# Patient Record
Sex: MALE | Race: WHITE | NOT HISPANIC OR LATINO | Employment: FULL TIME | URBAN - METROPOLITAN AREA
[De-identification: names, ages, dates, MRNs, and addresses within clinical notes are randomized per-mention and may not be internally consistent; named-entity substitution may affect disease eponyms.]

---

## 2017-02-18 ENCOUNTER — OFFICE VISIT (OUTPATIENT)
Dept: URGENT CARE | Facility: MEDICAL CENTER | Age: 47
End: 2017-02-18
Payer: COMMERCIAL

## 2017-02-18 PROCEDURE — 99213 OFFICE O/P EST LOW 20 MIN: CPT

## 2017-03-20 ENCOUNTER — OFFICE VISIT (OUTPATIENT)
Dept: URGENT CARE | Facility: MEDICAL CENTER | Age: 47
End: 2017-03-20
Payer: COMMERCIAL

## 2017-03-20 PROCEDURE — 99213 OFFICE O/P EST LOW 20 MIN: CPT

## 2017-04-21 ENCOUNTER — ALLSCRIPTS OFFICE VISIT (OUTPATIENT)
Dept: OTHER | Facility: OTHER | Age: 47
End: 2017-04-21

## 2017-04-21 DIAGNOSIS — Z87.19 PERSONAL HISTORY OF OTHER DISEASES OF THE DIGESTIVE SYSTEM: ICD-10-CM

## 2017-04-21 DIAGNOSIS — M54.9 DORSALGIA: ICD-10-CM

## 2017-04-21 DIAGNOSIS — E87.1 HYPO-OSMOLALITY AND HYPONATREMIA: ICD-10-CM

## 2017-04-21 DIAGNOSIS — E78.00 PURE HYPERCHOLESTEROLEMIA: ICD-10-CM

## 2017-05-29 ENCOUNTER — HOSPITAL ENCOUNTER (OUTPATIENT)
Dept: RADIOLOGY | Facility: MEDICAL CENTER | Age: 47
Discharge: HOME | End: 2017-05-29
Attending: PHYSICIAN ASSISTANT | Admitting: FAMILY MEDICINE
Payer: COMMERCIAL

## 2017-05-29 ENCOUNTER — OFFICE VISIT (OUTPATIENT)
Dept: URGENT CARE | Facility: MEDICAL CENTER | Age: 47
End: 2017-05-29
Payer: COMMERCIAL

## 2017-05-29 DIAGNOSIS — M65.9 SYNOVITIS AND TENOSYNOVITIS: ICD-10-CM

## 2017-05-29 DIAGNOSIS — M79.643 PAIN OF HAND: ICD-10-CM

## 2017-05-29 DIAGNOSIS — M79.641 PAIN OF RIGHT HAND: ICD-10-CM

## 2017-05-29 PROCEDURE — 73130 X-RAY EXAM OF HAND: CPT

## 2017-05-29 PROCEDURE — 99213 OFFICE O/P EST LOW 20 MIN: CPT

## 2017-06-14 ENCOUNTER — APPOINTMENT (OUTPATIENT)
Dept: RADIOLOGY | Facility: CLINIC | Age: 47
End: 2017-06-14
Payer: COMMERCIAL

## 2017-06-14 ENCOUNTER — ALLSCRIPTS OFFICE VISIT (OUTPATIENT)
Dept: OTHER | Facility: OTHER | Age: 47
End: 2017-06-14

## 2017-06-14 DIAGNOSIS — M79.643 PAIN OF HAND: ICD-10-CM

## 2017-06-14 PROCEDURE — 73130 X-RAY EXAM OF HAND: CPT

## 2017-09-11 ENCOUNTER — ALLSCRIPTS OFFICE VISIT (OUTPATIENT)
Dept: OTHER | Facility: OTHER | Age: 47
End: 2017-09-11

## 2017-09-11 DIAGNOSIS — E78.00 PURE HYPERCHOLESTEROLEMIA: ICD-10-CM

## 2017-09-11 DIAGNOSIS — S81.802A OPEN WOUND OF LEFT LOWER LEG: ICD-10-CM

## 2017-09-11 DIAGNOSIS — R74.8 ABNORMAL LEVELS OF OTHER SERUM ENZYMES: ICD-10-CM

## 2017-09-18 ENCOUNTER — GENERIC CONVERSION - ENCOUNTER (OUTPATIENT)
Dept: OTHER | Facility: OTHER | Age: 47
End: 2017-09-18

## 2017-09-19 ENCOUNTER — APPOINTMENT (OUTPATIENT)
Dept: LAB | Facility: MEDICAL CENTER | Age: 47
End: 2017-09-19
Payer: COMMERCIAL

## 2017-09-19 DIAGNOSIS — E78.00 PURE HYPERCHOLESTEROLEMIA: ICD-10-CM

## 2017-09-19 DIAGNOSIS — S81.802A OPEN WOUND OF LEFT LOWER LEG: ICD-10-CM

## 2017-09-19 DIAGNOSIS — R74.8 ABNORMAL LEVELS OF OTHER SERUM ENZYMES: ICD-10-CM

## 2017-09-19 LAB
ALBUMIN SERPL BCP-MCNC: 3.8 G/DL (ref 3.5–5)
ALP SERPL-CCNC: 75 U/L (ref 46–116)
ALT SERPL W P-5'-P-CCNC: 46 U/L (ref 12–78)
ANION GAP SERPL CALCULATED.3IONS-SCNC: 4 MMOL/L (ref 4–13)
AST SERPL W P-5'-P-CCNC: 37 U/L (ref 5–45)
BASOPHILS # BLD AUTO: 0.05 THOUSANDS/ΜL (ref 0–0.1)
BASOPHILS NFR BLD AUTO: 1 % (ref 0–1)
BILIRUB SERPL-MCNC: 0.52 MG/DL (ref 0.2–1)
BUN SERPL-MCNC: 12 MG/DL (ref 5–25)
CALCIUM SERPL-MCNC: 9.1 MG/DL (ref 8.3–10.1)
CHLORIDE SERPL-SCNC: 104 MMOL/L (ref 100–108)
CHOLEST SERPL-MCNC: 196 MG/DL (ref 50–200)
CO2 SERPL-SCNC: 29 MMOL/L (ref 21–32)
CREAT SERPL-MCNC: 0.93 MG/DL (ref 0.6–1.3)
EOSINOPHIL # BLD AUTO: 0.31 THOUSAND/ΜL (ref 0–0.61)
EOSINOPHIL NFR BLD AUTO: 6 % (ref 0–6)
ERYTHROCYTE [DISTWIDTH] IN BLOOD BY AUTOMATED COUNT: 13.2 % (ref 11.6–15.1)
EST. AVERAGE GLUCOSE BLD GHB EST-MCNC: 128 MG/DL
GFR SERPL CREATININE-BSD FRML MDRD: 97 ML/MIN/1.73SQ M
GLUCOSE P FAST SERPL-MCNC: 112 MG/DL (ref 65–99)
HBA1C MFR BLD: 6.1 % (ref 4.2–6.3)
HCT VFR BLD AUTO: 43 % (ref 36.5–49.3)
HDLC SERPL-MCNC: 29 MG/DL (ref 40–60)
HGB BLD-MCNC: 14.4 G/DL (ref 12–17)
LYMPHOCYTES # BLD AUTO: 2.22 THOUSANDS/ΜL (ref 0.6–4.47)
LYMPHOCYTES NFR BLD AUTO: 41 % (ref 14–44)
MCH RBC QN AUTO: 30 PG (ref 26.8–34.3)
MCHC RBC AUTO-ENTMCNC: 33.5 G/DL (ref 31.4–37.4)
MCV RBC AUTO: 90 FL (ref 82–98)
MONOCYTES # BLD AUTO: 0.35 THOUSAND/ΜL (ref 0.17–1.22)
MONOCYTES NFR BLD AUTO: 6 % (ref 4–12)
NEUTROPHILS # BLD AUTO: 2.5 THOUSANDS/ΜL (ref 1.85–7.62)
NEUTS SEG NFR BLD AUTO: 46 % (ref 43–75)
NRBC BLD AUTO-RTO: 0 /100 WBCS
PLATELET # BLD AUTO: 183 THOUSANDS/UL (ref 149–390)
PMV BLD AUTO: 10.8 FL (ref 8.9–12.7)
POTASSIUM SERPL-SCNC: 4.7 MMOL/L (ref 3.5–5.3)
PROT SERPL-MCNC: 7.4 G/DL (ref 6.4–8.2)
RBC # BLD AUTO: 4.8 MILLION/UL (ref 3.88–5.62)
SODIUM SERPL-SCNC: 137 MMOL/L (ref 136–145)
TRIGL SERPL-MCNC: 489 MG/DL
WBC # BLD AUTO: 5.44 THOUSAND/UL (ref 4.31–10.16)

## 2017-09-19 PROCEDURE — 80061 LIPID PANEL: CPT

## 2017-09-19 PROCEDURE — 85025 COMPLETE CBC W/AUTO DIFF WBC: CPT

## 2017-09-19 PROCEDURE — 80053 COMPREHEN METABOLIC PANEL: CPT

## 2017-09-19 PROCEDURE — 83036 HEMOGLOBIN GLYCOSYLATED A1C: CPT

## 2017-09-19 PROCEDURE — 36415 COLL VENOUS BLD VENIPUNCTURE: CPT

## 2017-09-25 ENCOUNTER — GENERIC CONVERSION - ENCOUNTER (OUTPATIENT)
Dept: OTHER | Facility: OTHER | Age: 47
End: 2017-09-25

## 2017-10-02 ENCOUNTER — ALLSCRIPTS OFFICE VISIT (OUTPATIENT)
Dept: OTHER | Facility: OTHER | Age: 47
End: 2017-10-02

## 2017-10-11 NOTE — PROGRESS NOTES
Assessment  1  Nonhealing ulcer of left lower leg (707 19) (L97 929)   2  Onychomycosis (110 1) (B35 1)    Plan   · Terbinafine HCl - 250 MG Oral Tablet; TAKE 1 TABLET DAILY AS DIRECTED   · Follow-up visit in 1 month Evaluation and Treatment  Follow-up  Status: Hold For -  Scheduling  Requested for: 58GXE2376    Discussion/Summary  Discussion Summary:   Ulceration heal no further treatment needed suggested just petrolatum in the area to protect the skin onychomycosis benefits and risks with therapy discussed with patient wishes to go ahead with treatment we'll start him on terbinafine 250 mg daily and recheck in one month  Chief Complaint  Chief Complaint Free Text Note Form: Recheck of wound on the left leg as well as concerns regarding his nail fungus      History of Present Illness  HPI: 58-year-old male presents for follow-up secondary to the nonhealing lesion on his left lower leg patient notes the area is itching also concerns regarding the changes in his toenails  Review of Systems  Complete Male Dermatology Donald Herrera Patient:   Constitutional: Denies constitutional symptoms  Eyes: Denies eye symptoms  ENT:  denies ear symptoms, nasal symptoms, mouth or throat symptoms  Cardiovascular: Denies cardiovascular symptoms  Respiratory: Denies respiratory symptoms  Gastrointestinal: Denies gastrointestinal symptoms  Musculoskeletal: Denies musculoskeletal symptoms  Integumentary: Denies skin, hair and nail symptoms  Neurological: Denies neurologic symptoms  Psychiatric: Denies psychiatric symptoms  Endocrine: Denies endocrine symptoms  Hematologic/Lymphatic: Denies hematologic symptoms  Active Problems  1  Abnormal liver enzymes (790 5) (R74 8)   2  Anxiety (300 00) (F41 9)   3  Back pain (724 5) (M54 9)   4  History of Cellulitis (682 9) (L03 90)   5  Chalazion of right eye (373 2) (H00 13)   6  Chest discomfort (786 59) (R07 89)   7   Contact dermatitis due to poison ivy (692  6) (L23 7)   8  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)   9  Hand pain (729 5) (M79 643)   10  History of colitis (V12 79) (Z87 19)   11  Hypercholesterolemia (272 0) (E78 00)   12  Hyponatremia (276 1) (E87 1)   13  Leg wound, left (891 0) (S81 802A)   14  Leukocytosis (288 60) (D72 829)   15  Liver mass (573 9) (R16 0)   16  Liver mass (573 9) (R16 0)   17  Malaise and fatigue (780 79) (R53 81,R53 83)   18  Mixed hyperlipidemia (272 2) (E78 2)   19  Nonhealing ulcer of left lower leg (707 19) (L97 929)   20  Obesity (278 00) (E66 9)   21  Onychomycosis (110 1) (B35 1)   22  TANA (obstructive sleep apnea) (327 23) (G47 33)   23  Overweight (278 02) (E66 3)   24  Poison ivy (692 6) (L23 7)   25  Prediabetes (790 29) (R73 03)   26  Right hand pain (729 5) (M79 641)   27  Screening for skin condition (V82 0) (Z13 89)   28  Sinusitis (473 9) (J32 9)   29  Skin rash (782 1) (R21)   30  Sleep disturbances (780 50) (G47 9)   31  Sore throat (462) (J02 9)   32  Spasm of lumbar paraspinous muscle (724 8) (M62 830)   33  Surgical wound infection (998 59) (T81 4XXA)   34  Trigger finger (727 03) (M65 30)    Past Medical History  1  History of Cellulitis (682 9) (L03 90)   2  History of chest pain (V13 89) (Z87 898)   3  History of hyperlipidemia (V12 29) (Z86 39)  Past Medical History Reviewed- Derm:   The past medical history was reviewed  Surgical History  1  History of Hand Repair   2  History of Oral Surgery Tooth Extraction  Surgical History Reviewed [employer]- Derm:   Surgical History reviewed      Family History  Mother    1  Family history of coronary artery disease (V17 3) (Z82 49)   2  Family history of malignant neoplasm of esophagus (V16 0) (Z80 0)  Father    3  Family history of coronary artery disease (V17 3) (Z82 49)   4   Family history of malignant neoplasm of esophagus (V16 0) (Z80 0)  Family History Reviewed- Derm:   Family History was reviewed      Social History   · Never a smoker   · Social alcohol use (Z78 9)   · Tobacco non-user (V49 89) (Z78 9)  Social History Reviewed [facility]- Derm: The social history was reviewed      Current Meds   1  Atorvastatin Calcium 80 MG Oral Tablet; Take 1 tablet daily  Requested for: 67UUL6437;   Last Rx:18Oct2016 Ordered   2  Clindamycin HCl - 300 MG Oral Capsule; TAKE 1 CAPSULE EVERY 6 HOURS DAILY; Therapy: 19Tok6349 to (Evaluate:94Dqr5293)  Requested for: 43Uyc8352; Last   Rx:67Gek3687 Ordered   3  Doxycycline Hyclate 100 MG Oral Tablet; One tab every 12 hours; Therapy: 53Ole4641 to (Last Rx:55Wsp4951) Ordered   4  Fenofibrate 145 MG Oral Tablet; TAKE 1 TABLET DAILY; Therapy: 40WNC5209 to (96 [id number])  Requested for: 41SYC6242; Last   Rx:87Ufa1464 Ordered   5  Lipitor 80 MG Oral Tablet; Therapy: (031 339 63 15) to Recorded   6  LORazepam 1 MG Oral Tablet; take 1 tablet daily as needed; Therapy: 63XYU0875 to (Evaluate:47Hbo5519); Last Rx:21Mar2017 Ordered   7  Pennsaid 2 % Transdermal Solution; Please apply 2 pumps to the affected area with   phonophoresis; Therapy: 51OVR1737 to (Last Rx:14Jun2017)  Requested for: 49NKV5891 Ordered   8  Tricor 145 MG Oral Tablet; Therapy: (010 339 63 15) to Recorded  Medication List Reviewed: The medication list was reviewed and updated today  Allergies  1  Penicillins   2  Vancomycin HCl SOLR    Physical Exam    Constitutional   General appearance: Appears healthy and well developed  Lymphatic   No visible disturbance  Musculoskeletal   Digits and nails: No clubbing, cyanosis or edema  Cutaneous and nail exam normal     Neuro/Psych   Alert and oriented x 3  Displays comfort and cooperation during encounterl  Affect is normal     Finding Ulceration on the left lower leg is completely healed destroyed hyperkeratotic nails noted on the left great toenail also 3 let toenails on the right foot also affected keratotic areas        Health Management  History of colitis   COLONOSCOPY; every 3 years; Last 18UEQ9774; Next Due: 22LPF3001; Active    Future Appointments    Date/Time Provider Specialty Site   10/23/2017 08:00 AM Feng Roth DO Internal Medicine [facility]   10/27/2017 02:30 PM Feng Roth DO Internal Medicine [facility]   11/15/2017 02:05 PM MONIQUE Tilley   Dermatology [facility]     Signatures   Electronically signed by : MONIQUE Narvaez ; Oct 10 2017  9:17AM EST                       (Author)

## 2017-10-23 ENCOUNTER — GENERIC CONVERSION - ENCOUNTER (OUTPATIENT)
Dept: OTHER | Facility: OTHER | Age: 47
End: 2017-10-23

## 2017-10-27 NOTE — PROGRESS NOTES
Chief Complaint  CC: Patient here for lesion or wound on his leg  History of Present Illness  50-year-old male who I have not seen for many years presents in because of concern regarding a nonhealing area on his left leg patient reports having history of cellulitis  Patient reports scratching at the area and notes itching in the areas well also concerned regarding toenail changes patient reportedly said that I treated him in the past for last time I saw patient was 2009 no records at this time available      Assessment  Assessed    1  Nonhealing ulcer of left lower leg (707 19) (L97 929)   2  Onychomycosis (110 1) (B35 1)   3  Screening for skin condition (V82 0) (Z13 89)    Active Problems  Problems    1  Abnormal liver enzymes (790 5) (R74 8)   2  Anxiety (300 00) (F41 9)   3  Back pain (724 5) (M54 9)   4  History of Cellulitis (682 9) (L03 90)   5  Chalazion of right eye (373 2) (H00 13)   6  Chest discomfort (786 59) (R07 89)   7  Contact dermatitis due to poison ivy (692 6) (L23 7)   8  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)   9  Hand pain (729 5) (M79 643)   10  History of colitis (V12 79) (Z87 19)   11  Hypercholesterolemia (272 0) (E78 00)   12  Hyponatremia (276 1) (E87 1)   13  Leg wound, left (891 0) (S81 802A)   14  Leukocytosis (288 60) (D72 829)   15  Liver mass (573 9) (R16 0)   16  Liver mass (573 9) (R16 0)   17  Malaise and fatigue (780 79) (R53 81,R53 83)   18  Mixed hyperlipidemia (272 2) (E78 2)   19  Obesity (278 00) (E66 9)   20  TANA (obstructive sleep apnea) (327 23) (G47 33)   21  Overweight (278 02) (E66 3)   22  Poison ivy (692 6) (L23 7)   23  Prediabetes (790 29) (R73 03)   24  Right hand pain (729 5) (M79 641)   25  Sinusitis (473 9) (J32 9)   26  Skin rash (782 1) (R21)   27  Sleep disturbances (780 50) (G47 9)   28  Sore throat (462) (J02 9)   29  Spasm of lumbar paraspinous muscle (724 8) (M62 830)   30  Surgical wound infection (998 59) (T81 4XXA)   31   Trigger finger (727 03) (M65 30)    Past Medical History  Problems    1  History of Cellulitis (682 9) (L03 90)   2  History of chest pain (V13 89) (Z87 898)   3  History of hyperlipidemia (V12 29) (Z86 39)    The past medical history was reviewed  Surgical History  Problems    1  History of Hand Repair   2  History of Oral Surgery Tooth Extraction    Surgical History reviewed      Family History  Mother    1  Family history of coronary artery disease (V17 3) (Z82 49)   2  Family history of malignant neoplasm of esophagus (V16 0) (Z80 0)  Father    3  Family history of coronary artery disease (V17 3) (Z82 49)   4  Family history of malignant neoplasm of esophagus (V16 0) (Z80 0)  Family History Reviewed- Derm:   Family History was reviewed      Social History  Problems    · Never a smoker  The social history was reviewed      Current Meds   1  Atorvastatin Calcium 80 MG Oral Tablet; Take 1 tablet daily  Requested for: 82JHD1187;   Last Rx:07Pif6758 Ordered   2  Clindamycin HCl - 300 MG Oral Capsule; TAKE 1 CAPSULE EVERY 6 HOURS DAILY; Therapy: 32Mvt9425 to (Evaluate:18Tfp0096)  Requested for: 42Rvc9581; Last   Rx:93Gce7804 Ordered   3  Doxycycline Hyclate 100 MG Oral Tablet; One tab every 12 hours; Therapy: 23Gse5746 to (Last Rx:85Xvf7046) Ordered   4  Fenofibrate 145 MG Oral Tablet; TAKE 1 TABLET DAILY; Therapy: 62SIK8157 to (23 902766)  Requested for: 16RPU8697; Last   Rx:50Blg4130 Ordered   5  Lipitor 80 MG Oral Tablet; Therapy: (031 339 63 15) to Recorded   6  LORazepam 1 MG Oral Tablet; take 1 tablet daily as needed; Therapy: 64MGP9135 to (Evaluate:93Bxw3607); Last Rx:21Mar2017 Ordered   7  Pennsaid 2 % Transdermal Solution; Please apply 2 pumps to the affected area with   phonophoresis; Therapy: 19OKC2515 to (Last Rx:14Jun2017)  Requested for: 95RTM9218 Ordered   8  Tricor 145 MG Oral Tablet;    Therapy: (619 561 63 15) to Recorded    Review of Systems    Constitutional: Denies constitutional symptoms  Eyes: Denies eye symptoms  ENT:  denies ear symptoms, nasal symptoms, mouth or throat symptoms  Cardiovascular: Denies cardiovascular symptoms  Respiratory: Denies respiratory symptoms  Gastrointestinal: Denies gastrointestinal symptoms  Musculoskeletal: Denies musculoskeletal symptoms  Integumentary: Denies symptoms other than stated above  Neurological: Denies neurologic symptoms  Psychiatric: Denies psychiatric symptoms  Endocrine: Denies endocrine symptoms  Hematologic/Lymphatic: Denies hematologic symptoms  Allergies  Medication    1  Penicillins   2  Vancomycin HCl SOLR    Physical Exam    Constitutional   General appearance: Appears healthy and well developed  Lymphatic   No visible disturbance  Musculoskeletal   Digits and nails: Abnormal     Skin   Scalp skin texture and hair distribution: Normal skin texture on scalp, normal hair distribution  Head: Normal turgor, no rashes, no lesions  Neck: Normal turgor, no rashes, no lesions  Chest: Normal turgor, no rashes, no lesions  Abdomen: Normal turgor, no rashes, no lesions  Back: Normal turgor, no rashes, no lesions  Right upper extremity: Normal turgor, no rashes, no lesions  Left upper extremity: Normal turgor, no rashes, no lesions  Right lower extremity: Normal turgor, no rashes, no lesions  Left lower extremity: Abnormal     Neuro/Psych   Alert and oriented x 3  Displays comfort and cooperation during encounterl  Affect is normal     Finding Small superficial erosion noted on the left lower leg with erythema scaling around the area hyperkeratotic chronic lysis noted on the nails with white discolorations on the left great toe and numerous toes on the right foot as well   Nothing else of concern noted on complete exam       Plan  Nonhealing ulcer of left lower leg    · Wound care as instructed ; Status:Complete;   Done: 74WOQ7705    Discussion/Summary    Assessment #1: Nonhealing ulceration of left leg  Care Plan:   Question secondary to his chronic scratching at the area and stasis issues we'll go ahead and place a DuoDERM over the area and recheck in one week to see if we get this healed  Assessment #2: Onychomycosis  Care Plan:   Presumptive diagnosis a consider treatment with terbinafine await until we get the ulceration healed on the left leg  Assessment #3: Screening for dermatologic disorders  Care Plan:   Nothing else of concern noted on complete exam follow-up yearly        Future Appointments    Date/Time Provider Specialty Site   10/10/2017 09:05 AM Photo Selena Magaña, Nurse Schedule  [facility]   10/23/2017 08:00 AM Xochitl Ayala DO Internal Medicine [facility]   10/27/2017 02:30 PM [facility]     Signatures   Electronically signed by : MONIQUE West ; Oct  2 2017  1:40PM EST                       (Author)

## 2018-01-11 NOTE — MISCELLANEOUS
Message  I tried to get hold of the patient  He did not   I left a message to call me tomorrow        Signatures   Electronically signed by : Kelsie Deluca DO; May  3 2016  6:31PM EST                       (Author)

## 2018-01-11 NOTE — MISCELLANEOUS
Message  I called the patient  I told him about his ultrasound  I had spoken to the radiologist  He needs an MRI with contrast  This will be scheduled  He will call me for the results        Signatures   Electronically signed by : Lee Bocanegra DO; May  4 2016  6:58PM EST                       (Author)

## 2018-01-12 VITALS
WEIGHT: 242 LBS | SYSTOLIC BLOOD PRESSURE: 126 MMHG | HEIGHT: 72 IN | BODY MASS INDEX: 32.78 KG/M2 | HEART RATE: 78 BPM | DIASTOLIC BLOOD PRESSURE: 80 MMHG

## 2018-01-12 VITALS
SYSTOLIC BLOOD PRESSURE: 124 MMHG | WEIGHT: 242 LBS | OXYGEN SATURATION: 97 % | HEIGHT: 72 IN | HEART RATE: 84 BPM | DIASTOLIC BLOOD PRESSURE: 82 MMHG | BODY MASS INDEX: 32.78 KG/M2

## 2018-01-15 VITALS
DIASTOLIC BLOOD PRESSURE: 80 MMHG | HEART RATE: 81 BPM | WEIGHT: 233 LBS | OXYGEN SATURATION: 97 % | BODY MASS INDEX: 31.56 KG/M2 | HEIGHT: 72 IN | SYSTOLIC BLOOD PRESSURE: 120 MMHG

## 2018-01-15 NOTE — MISCELLANEOUS
Message  I called the patient again  He again did not  the followed by left a message that his MRI scan of his liver was normal except for large liver   There was no steatosis      Signatures   Electronically signed by : Philip Malagon DO; Jun 7 2016  6:49PM EST                       (Author)

## 2018-01-22 VITALS
OXYGEN SATURATION: 97 % | HEART RATE: 62 BPM | BODY MASS INDEX: 31.83 KG/M2 | SYSTOLIC BLOOD PRESSURE: 116 MMHG | DIASTOLIC BLOOD PRESSURE: 78 MMHG | HEIGHT: 72 IN | WEIGHT: 235 LBS

## 2018-01-22 VITALS
BODY MASS INDEX: 31.15 KG/M2 | HEART RATE: 71 BPM | HEIGHT: 72 IN | DIASTOLIC BLOOD PRESSURE: 78 MMHG | SYSTOLIC BLOOD PRESSURE: 118 MMHG | OXYGEN SATURATION: 98 % | WEIGHT: 230 LBS

## 2018-01-22 VITALS
BODY MASS INDEX: 32.1 KG/M2 | WEIGHT: 237 LBS | HEIGHT: 72 IN | DIASTOLIC BLOOD PRESSURE: 80 MMHG | HEART RATE: 94 BPM | OXYGEN SATURATION: 96 % | SYSTOLIC BLOOD PRESSURE: 128 MMHG

## 2018-02-01 ENCOUNTER — OFFICE VISIT (OUTPATIENT)
Dept: OBGYN CLINIC | Facility: MEDICAL CENTER | Age: 48
End: 2018-02-01
Payer: COMMERCIAL

## 2018-02-01 VITALS
BODY MASS INDEX: 31.56 KG/M2 | DIASTOLIC BLOOD PRESSURE: 74 MMHG | WEIGHT: 233 LBS | HEIGHT: 72 IN | HEART RATE: 86 BPM | SYSTOLIC BLOOD PRESSURE: 130 MMHG

## 2018-02-01 DIAGNOSIS — M65.341 TRIGGER RING FINGER OF RIGHT HAND: Primary | ICD-10-CM

## 2018-02-01 PROCEDURE — 99214 OFFICE O/P EST MOD 30 MIN: CPT | Performed by: ORTHOPAEDIC SURGERY

## 2018-02-01 PROCEDURE — 20550 NJX 1 TENDON SHEATH/LIGAMENT: CPT | Performed by: ORTHOPAEDIC SURGERY

## 2018-02-01 RX ORDER — BETAMETHASONE SODIUM PHOSPHATE AND BETAMETHASONE ACETATE 3; 3 MG/ML; MG/ML
6 INJECTION, SUSPENSION INTRA-ARTICULAR; INTRALESIONAL; INTRAMUSCULAR; SOFT TISSUE
Status: COMPLETED | OUTPATIENT
Start: 2018-02-01 | End: 2018-02-01

## 2018-02-01 RX ORDER — BUPIVACAINE HYDROCHLORIDE 2.5 MG/ML
1 INJECTION, SOLUTION INFILTRATION; PERINEURAL
Status: COMPLETED | OUTPATIENT
Start: 2018-02-01 | End: 2018-02-01

## 2018-02-01 RX ORDER — CLINDAMYCIN PHOSPHATE 600 MG/50ML
600 INJECTION INTRAVENOUS ONCE
Status: CANCELLED | OUTPATIENT
Start: 2018-02-01

## 2018-02-01 RX ADMIN — BUPIVACAINE HYDROCHLORIDE 1 ML: 2.5 INJECTION, SOLUTION INFILTRATION; PERINEURAL at 08:54

## 2018-02-01 RX ADMIN — BETAMETHASONE SODIUM PHOSPHATE AND BETAMETHASONE ACETATE 6 MG: 3; 3 INJECTION, SUSPENSION INTRA-ARTICULAR; INTRALESIONAL; INTRAMUSCULAR; SOFT TISSUE at 08:54

## 2018-02-01 NOTE — PROGRESS NOTES
Assessment:  1  Trigger ring finger of right hand  Hand/upper extremity injection     There is no problem list on file for this patient  Plan      Injection given into the trigger finger  Patient would also like to plan for procedure of trigger finger release            Subjective:     Patient ID:    Chief Complaint:Torres Bach 51 y o  male      HPI    Patient comes in today with regards to right ring finger  The patient reports that the pain is in the base of finger and has been going on for 1 5 months     The pain is rated at7 at its best and0 at its worst   The pain is described as a stabbing  It is worsened with flexion of the finger, and is made better with ice  The patient has taken no for treatment  Had cortisone injections for other fingers but not this ring finger  The following portions of the patient's history were reviewed and updated as appropriate: allergies, current medications, past family history, past social history, past surgical history and problem list         Social History     Social History    Marital status: /Civil Union     Spouse name: N/A    Number of children: N/A    Years of education: N/A     Occupational History    Not on file  Social History Main Topics    Smoking status: Never Smoker    Smokeless tobacco: Never Used    Alcohol use Yes      Comment: twice a month    Drug use: No    Sexual activity: Not on file     Other Topics Concern    Not on file     Social History Narrative    No narrative on file     Past Medical History:   Diagnosis Date    Cellulitis     right calf, above left foot    Colitis     Hypercholesteremia      Past Surgical History:   Procedure Laterality Date    NH COLONOSCOPY FLX DX W/COLLJ SPEC WHEN PFRMD N/A 2/25/2016    Procedure: COLONOSCOPY;  Surgeon: Blanca Nelson MD;  [facility];   Service: Gastroenterology    TRIGGER FINGER RELEASE       Allergies   Allergen Reactions    Penicillins Other (See Comments)     Does not know, was told he was allergic    Vancomycin Rash     Current Outpatient Prescriptions on File Prior to Visit   Medication Sig Dispense Refill    atorvastatin (LIPITOR) 80 mg tablet Take 80 mg by mouth daily   fenofibrate (TRICOR) 145 mg tablet Take 145 mg by mouth daily  No current facility-administered medications on file prior to visit  Objective:    Review of Systems   Constitutional: Negative  HENT: Negative for sneezing and sore throat  Eyes: Negative for visual disturbance  Respiratory: Negative for shortness of breath and wheezing  Cardiovascular: Negative for palpitations  Gastrointestinal: Negative for vomiting  Genitourinary: Negative for frequency  Skin: Negative  Neurological: Negative  Psychiatric/Behavioral: Negative  All other systems reviewed and are negative  Right Hand Exam   Right hand exam is normal     Tenderness   Right hand tenderness location: Tenderness over the ring finger A1 pulley region  Range of Motion     Wrist   Extension: normal   Flexion: normal   Pronation: normal   Supination: normal     Muscle Strength   The patient has normal right wrist strength  Other   Erythema: absent  Scars: present    Comments:  Right index vertical incisions for trigger finger      Left Hand Exam     Range of Motion   The patient has normal left wrist ROM  Wrist   Extension: normal   Flexion: normal   Pronation: normal   Supination: normal     Muscle Strength   The patient has normal left wrist strength      Other   Scars: present    Comments:  Left index vertical incision for trigger finger release        Hand/upper extremity injection  Date/Time: 2/1/2018 8:54 AM  Consent given by: patient  Procedure Details  Condition:trigger finger Needle size: 25 G  Medications administered: 1 mL bupivacaine 0 25 %; 6 mg betamethasone acetate-betamethasone sodium phosphate 6 (3-3) mg/mL             Physical Exam    I have personally reviewed pertinent films in PACS

## 2018-02-09 ENCOUNTER — APPOINTMENT (OUTPATIENT)
Dept: LAB | Facility: MEDICAL CENTER | Age: 48
End: 2018-02-09
Payer: COMMERCIAL

## 2018-02-09 LAB
ANION GAP SERPL CALCULATED.3IONS-SCNC: 8 MMOL/L (ref 4–13)
BASOPHILS # BLD AUTO: 0.08 THOUSANDS/ΜL (ref 0–0.1)
BASOPHILS NFR BLD AUTO: 1 % (ref 0–1)
BILIRUB UR QL STRIP: NEGATIVE
BUN SERPL-MCNC: 11 MG/DL (ref 5–25)
CALCIUM SERPL-MCNC: 9.4 MG/DL (ref 8.3–10.1)
CHLORIDE SERPL-SCNC: 104 MMOL/L (ref 100–108)
CLARITY UR: CLEAR
CO2 SERPL-SCNC: 26 MMOL/L (ref 21–32)
COLOR UR: YELLOW
CREAT SERPL-MCNC: 0.9 MG/DL (ref 0.6–1.3)
EOSINOPHIL # BLD AUTO: 0.3 THOUSAND/ΜL (ref 0–0.61)
EOSINOPHIL NFR BLD AUTO: 5 % (ref 0–6)
ERYTHROCYTE [DISTWIDTH] IN BLOOD BY AUTOMATED COUNT: 13.2 % (ref 11.6–15.1)
GFR SERPL CREATININE-BSD FRML MDRD: 100 ML/MIN/1.73SQ M
GLUCOSE P FAST SERPL-MCNC: 109 MG/DL (ref 65–99)
GLUCOSE UR STRIP-MCNC: NEGATIVE MG/DL
HCT VFR BLD AUTO: 43 % (ref 36.5–49.3)
HGB BLD-MCNC: 14.7 G/DL (ref 12–17)
HGB UR QL STRIP.AUTO: NEGATIVE
KETONES UR STRIP-MCNC: NEGATIVE MG/DL
LEUKOCYTE ESTERASE UR QL STRIP: NEGATIVE
LYMPHOCYTES # BLD AUTO: 2.31 THOUSANDS/ΜL (ref 0.6–4.47)
LYMPHOCYTES NFR BLD AUTO: 41 % (ref 14–44)
MCH RBC QN AUTO: 30.1 PG (ref 26.8–34.3)
MCHC RBC AUTO-ENTMCNC: 34.2 G/DL (ref 31.4–37.4)
MCV RBC AUTO: 88 FL (ref 82–98)
MONOCYTES # BLD AUTO: 0.29 THOUSAND/ΜL (ref 0.17–1.22)
MONOCYTES NFR BLD AUTO: 5 % (ref 4–12)
NEUTROPHILS # BLD AUTO: 2.6 THOUSANDS/ΜL (ref 1.85–7.62)
NEUTS SEG NFR BLD AUTO: 48 % (ref 43–75)
NITRITE UR QL STRIP: NEGATIVE
NRBC BLD AUTO-RTO: 0 /100 WBCS
PH UR STRIP.AUTO: 6 [PH] (ref 4.5–8)
PLATELET # BLD AUTO: 200 THOUSANDS/UL (ref 149–390)
PMV BLD AUTO: 10.9 FL (ref 8.9–12.7)
POTASSIUM SERPL-SCNC: 4.3 MMOL/L (ref 3.5–5.3)
PROT UR STRIP-MCNC: NEGATIVE MG/DL
RBC # BLD AUTO: 4.89 MILLION/UL (ref 3.88–5.62)
SODIUM SERPL-SCNC: 138 MMOL/L (ref 136–145)
SP GR UR STRIP.AUTO: 1.03 (ref 1–1.03)
UROBILINOGEN UR QL STRIP.AUTO: 0.2 E.U./DL
WBC # BLD AUTO: 5.59 THOUSAND/UL (ref 4.31–10.16)

## 2018-02-09 PROCEDURE — 85025 COMPLETE CBC W/AUTO DIFF WBC: CPT | Performed by: ORTHOPAEDIC SURGERY

## 2018-02-09 PROCEDURE — 81003 URINALYSIS AUTO W/O SCOPE: CPT | Performed by: ORTHOPAEDIC SURGERY

## 2018-02-09 PROCEDURE — 36415 COLL VENOUS BLD VENIPUNCTURE: CPT | Performed by: ORTHOPAEDIC SURGERY

## 2018-02-09 PROCEDURE — 80048 BASIC METABOLIC PNL TOTAL CA: CPT | Performed by: ORTHOPAEDIC SURGERY

## 2018-02-15 ENCOUNTER — CONSULT (OUTPATIENT)
Dept: INTERNAL MEDICINE CLINIC | Facility: CLINIC | Age: 48
End: 2018-02-15
Payer: COMMERCIAL

## 2018-02-15 VITALS
SYSTOLIC BLOOD PRESSURE: 108 MMHG | HEART RATE: 69 BPM | DIASTOLIC BLOOD PRESSURE: 76 MMHG | HEIGHT: 72 IN | BODY MASS INDEX: 30.96 KG/M2 | TEMPERATURE: 97.1 F | WEIGHT: 228.6 LBS | OXYGEN SATURATION: 96 %

## 2018-02-15 DIAGNOSIS — H10.012 ACUTE FOLLICULAR CONJUNCTIVITIS OF LEFT EYE: ICD-10-CM

## 2018-02-15 DIAGNOSIS — M65.341 TRIGGER RING FINGER OF RIGHT HAND: ICD-10-CM

## 2018-02-15 DIAGNOSIS — Z01.818 PREOP EXAMINATION: Primary | ICD-10-CM

## 2018-02-15 PROBLEM — E78.2 MIXED HYPERLIPIDEMIA: Status: ACTIVE | Noted: 2017-09-26

## 2018-02-15 PROCEDURE — 99242 OFF/OP CONSLTJ NEW/EST SF 20: CPT | Performed by: PHYSICIAN ASSISTANT

## 2018-02-16 NOTE — PROGRESS NOTES
Assessment/Plan:  He is cleared for the surgery CBC and chemistries are normal   An EKG was not ordered or done    No problem-specific Assessment & Plan notes found for this encounter  Diagnoses and all orders for this visit:    Preop examination    Trigger ring finger of right hand    Acute follicular conjunctivitis of left eye  -     tobramycin (TOBREX) 0 3 % OINT; Administer 0 5 inches to the right eye 3 (three) times a day          Subjective:      Patient ID: Connor Ramey is a 51 y o  male  He is having a trigger finger release  He has had other trigger finger releases  Otherwise he is feeling well no complaints  No shortness of breath chest pain palpitations dizziness nausea vomiting        The following portions of the patient's history were reviewed and updated as appropriate: allergies, current medications, past family history, past medical history, past social history, past surgical history and problem list     Review of Systems   Constitutional: Negative for activity change, appetite change, chills, diaphoresis, fatigue, fever and unexpected weight change  HENT: Negative for congestion, dental problem, drooling, ear discharge, ear pain, facial swelling, hearing loss, nosebleeds, postnasal drip, rhinorrhea, sinus pain, sinus pressure, sneezing, sore throat, tinnitus, trouble swallowing and voice change  Eyes: Negative for photophobia, pain, discharge, redness, itching and visual disturbance  Respiratory: Negative for apnea, cough, choking, chest tightness, shortness of breath, wheezing and stridor  Cardiovascular: Negative for chest pain, palpitations and leg swelling  Gastrointestinal: Negative for abdominal distention, abdominal pain, anal bleeding, blood in stool, constipation, diarrhea, nausea, rectal pain and vomiting  Endocrine: Negative for cold intolerance, heat intolerance, polydipsia, polyphagia and polyuria     Genitourinary: Negative for decreased urine volume, difficulty urinating, dysuria, enuresis, flank pain, frequency, genital sores, hematuria and urgency  Musculoskeletal: Negative for arthralgias (At trigger finger), back pain, gait problem, joint swelling, myalgias, neck pain and neck stiffness  Skin: Negative for color change, pallor, rash and wound  Neurological: Negative for dizziness, tremors, seizures, syncope, facial asymmetry, speech difficulty, weakness, light-headedness, numbness and headaches  Hematological: Negative for adenopathy  Does not bruise/bleed easily  Psychiatric/Behavioral: Negative for agitation, behavioral problems, confusion, decreased concentration, dysphoric mood, hallucinations, self-injury, sleep disturbance and suicidal ideas  The patient is not nervous/anxious and is not hyperactive  Objective:    /76 (BP Location: Left arm, Patient Position: Sitting)   Pulse 69   Temp (!) 97 1 °F (36 2 °C)   Ht 6' (1 829 m)   Wt 104 kg (228 lb 9 6 oz)   SpO2 96%   BMI 31 00 kg/m²      Physical Exam   Constitutional: He is oriented to person, place, and time  He appears well-developed  HENT:   Head: Normocephalic  Right Ear: External ear normal    Left Ear: External ear normal    Mouth/Throat: Oropharynx is clear and moist    Eyes: Conjunctivae are normal  Pupils are equal, round, and reactive to light  Neck: Neck supple  No thyromegaly present  Cardiovascular: Normal rate, regular rhythm, normal heart sounds and intact distal pulses  Pulmonary/Chest: Effort normal and breath sounds normal    Abdominal: Soft  Bowel sounds are normal    Musculoskeletal: Normal range of motion  Trigger finger right 4th   Neurological: He is alert and oriented to person, place, and time  He has normal reflexes  Skin: Skin is warm and dry

## 2018-02-23 DIAGNOSIS — E78.00 PURE HYPERCHOLESTEROLEMIA: ICD-10-CM

## 2018-02-23 DIAGNOSIS — R73.03 PREDIABETES: ICD-10-CM

## 2018-02-26 ENCOUNTER — ANESTHESIA EVENT (OUTPATIENT)
Dept: PERIOP | Facility: HOSPITAL | Age: 48
End: 2018-02-26

## 2018-02-27 ENCOUNTER — ANESTHESIA (OUTPATIENT)
Dept: PERIOP | Facility: HOSPITAL | Age: 48
End: 2018-02-27

## 2018-02-28 PROBLEM — M65.341 TRIGGER FINGER, RIGHT RING FINGER: Status: ACTIVE | Noted: 2018-02-28

## 2018-03-07 ENCOUNTER — APPOINTMENT (OUTPATIENT)
Dept: RADIOLOGY | Facility: MEDICAL CENTER | Age: 48
End: 2018-03-07
Payer: COMMERCIAL

## 2018-03-07 ENCOUNTER — TRANSCRIBE ORDERS (OUTPATIENT)
Dept: ADMINISTRATIVE | Facility: HOSPITAL | Age: 48
End: 2018-03-07

## 2018-03-07 DIAGNOSIS — M54.5 LOW BACK PAIN, UNSPECIFIED BACK PAIN LATERALITY, UNSPECIFIED CHRONICITY, WITH SCIATICA PRESENCE UNSPECIFIED: ICD-10-CM

## 2018-03-07 DIAGNOSIS — M54.5 LOW BACK PAIN, UNSPECIFIED BACK PAIN LATERALITY, UNSPECIFIED CHRONICITY, WITH SCIATICA PRESENCE UNSPECIFIED: Primary | ICD-10-CM

## 2018-03-07 PROCEDURE — 72110 X-RAY EXAM L-2 SPINE 4/>VWS: CPT

## 2018-03-13 DIAGNOSIS — E78.2 MIXED HYPERLIPIDEMIA: Primary | ICD-10-CM

## 2018-03-13 RX ORDER — FENOFIBRATE 145 MG/1
TABLET, COATED ORAL
Qty: 90 TABLET | Refills: 3 | Status: SHIPPED | OUTPATIENT
Start: 2018-03-13

## 2018-04-10 ENCOUNTER — ANESTHESIA EVENT (OUTPATIENT)
Dept: PERIOP | Facility: HOSPITAL | Age: 48
End: 2018-04-10

## 2018-04-10 ENCOUNTER — ANESTHESIA (OUTPATIENT)
Dept: PERIOP | Facility: HOSPITAL | Age: 48
End: 2018-04-10

## 2018-05-08 ENCOUNTER — OFFICE VISIT (OUTPATIENT)
Dept: URGENT CARE | Facility: MEDICAL CENTER | Age: 48
End: 2018-05-08
Payer: COMMERCIAL

## 2018-05-08 VITALS
TEMPERATURE: 97.6 F | SYSTOLIC BLOOD PRESSURE: 110 MMHG | HEART RATE: 62 BPM | HEIGHT: 72 IN | DIASTOLIC BLOOD PRESSURE: 70 MMHG | BODY MASS INDEX: 28.07 KG/M2 | WEIGHT: 207.25 LBS | OXYGEN SATURATION: 98 % | RESPIRATION RATE: 20 BRPM

## 2018-05-08 DIAGNOSIS — L03.811 CELLULITIS OF HEAD (ANY PART, EXCEPT FACE): Primary | ICD-10-CM

## 2018-05-08 PROCEDURE — 99213 OFFICE O/P EST LOW 20 MIN: CPT | Performed by: FAMILY MEDICINE

## 2018-05-08 RX ORDER — SULFAMETHOXAZOLE AND TRIMETHOPRIM 800; 160 MG/1; MG/1
1 TABLET ORAL EVERY 12 HOURS SCHEDULED
Qty: 20 TABLET | Refills: 0 | Status: SHIPPED | OUTPATIENT
Start: 2018-05-08 | End: 2018-05-18

## 2018-05-08 NOTE — PATIENT INSTRUCTIONS
Take antibiotic as directed  Eat yogurt to avoid GI upset  Monitor for increasing signs of infection: redness, warmth to touch, fever/chills, nausea/vomiting,  discharge, red streaking  Follow up with PCP in 1-2 days  Go to the ER for worsening symptoms  Cellulitis   WHAT YOU NEED TO KNOW:   Cellulitis is a skin infection caused by bacteria  Cellulitis may go away on its own or you may need treatment  Your healthcare provider may draw a [place] around the outside edges of your cellulitis  If your cellulitis spreads, your healthcare provider will see it outside of the [place]  DISCHARGE INSTRUCTIONS:   Call 911 if:   · You have sudden trouble breathing or chest pain  Return to the emergency department if:   · Your wound gets larger and more painful  · You feel a crackling under your skin when you touch it  · You have purple dots or bumps on your skin, or you see bleeding under your skin  · You have new swelling and pain in your legs  · The red, warm, swollen area gets larger  · You see red streaks coming from the infected area  Contact your healthcare provider if:   · You have a fever  · Your fever or pain does not go away or gets worse  · The area does not get smaller after 2 days of antibiotics  · Your skin is flaking or peeling off  · You have questions or concerns about your condition or care  Medicines:   · Antibiotics  help treat the bacterial infection  · NSAIDs , such as ibuprofen, help decrease swelling, pain, and fever  NSAIDs can cause stomach bleeding or kidney problems in certain people  If you take blood thinner medicine, always ask if NSAIDs are safe for you  Always read the medicine label and follow directions  Do not give these medicines to children under 10months of age without direction from your child's healthcare provider  · Acetaminophen  decreases pain and fever  It is available without a doctor's order   Ask how much to take and how often to take it  Follow directions  Read the labels of all other medicines you are using to see if they also contain acetaminophen, or ask your doctor or pharmacist  Acetaminophen can cause liver damage if not taken correctly  Do not use more than 4 grams (4,000 milligrams) total of acetaminophen in one day  · Take your medicine as directed  Contact your healthcare provider if you think your medicine is not helping or if you have side effects  Tell him or her if you are allergic to any medicine  Keep a list of the medicines, vitamins, and herbs you take  Include the amounts, and when and why you take them  Bring the list or the pill bottles to follow-up visits  Carry your medicine list with you in case of an emergency  Self-care:   · Elevate the area above the level of your heart  as often as you can  This will help decrease swelling and pain  Prop the area on pillows or blankets to keep it elevated comfortably  · Clean the area daily until the wound scabs over  Gently wash the area with soap and water  Pat dry  Use dressings as directed  · Place cool or warm, wet cloths on the area as directed  Use clean cloths and clean water  Leave it on the area until the cloth is room temperature  Pat the area dry with a clean, dry cloth  The cloths may help decrease pain  Prevent cellulitis:   · Do not scratch bug bites or areas of injury  You increase your risk for cellulitis by scratching these areas  · Do not share personal items, such as towels, clothing, and razors  · Clean exercise equipment  with germ-killing  before and after you use it  · Wash your hands often  Use soap and water  Wash your hands after you use the bathroom, change a child's diapers, or sneeze  Wash your hands before you prepare or eat food  Use lotion to prevent dry, cracked skin  · Wear pressure stockings as directed  You may be told to wear the stockings if you have peripheral edema   The stockings improve blood flow and decrease swelling  · Treat athlete's foot  This can help prevent the spread of a bacterial skin infection  Follow up with your healthcare provider within 3 days, or as directed: Your healthcare provider will check if your cellulitis is getting better  You may need different medicine  Write down your questions so you remember to ask them during your visits  © 2017 [employer] Information is for End User's use only and may not be sold, redistributed or otherwise used for commercial purposes  All illustrations and images included in CareNotes® are the copyrighted property of [employer]  or Aryan Wade  The above information is an  only  It is not intended as medical advice for individual conditions or treatments  Talk to your doctor, nurse or pharmacist before following any medical regimen to see if it is safe and effective for you

## 2018-05-15 NOTE — PROGRESS NOTES
[facility]        NAME: Sharon Jacobson is a 51 y o  male  : 1970    MRN: [id number]      Assessment and Plan   Cellulitis of head (any part, except face) [L03 811]  1  Cellulitis of head (any part, except face)  sulfamethoxazole-trimethoprim (BACTRIM DS) 800-160 mg per tablet    mupirocin (BACTROBAN) 2 % ointment         Patient Instructions     Take antibiotic as directed  Eat yogurt to avoid GI upset  Monitor for increasing signs of infection: redness, warmth to touch, fever/chills, nausea/vomiting,  discharge, red streaking  Follow up with PCP in 1-2 days  Go to the ER for worsening symptoms  Chief Complaint     Chief Complaint   Patient presents with    Cellulitis     Cut scalp behind right ear last Friday shaving         History of Present Illness       This is a 79-year-old male complaining cellulitis on the left occipital region of his head  Patient reports he was shaving his head and cut it  Patient reports the last 3 days it has become red tender to touch of the yellow discharge  Patient denies any fever chills, nausea vomiting, diarrhea, constipation  Patient reports history of cellulitis and is concerned due to the redness  Review of Systems   Review of Systems   Constitutional: Negative for chills, fatigue and fever  HENT: Negative for congestion, ear pain, hearing loss, postnasal drip, sinus pain, sinus pressure and sore throat  Eyes: Negative for pain and discharge  Respiratory: Negative for chest tightness and shortness of breath  Cardiovascular: Negative for chest pain  Gastrointestinal: Negative for abdominal pain, constipation, nausea and vomiting  Genitourinary: Negative for difficulty urinating  Musculoskeletal: Negative for arthralgias and myalgias  Skin: Positive for wound  Negative for rash  Neurological: Negative for dizziness and headaches  Psychiatric/Behavioral: Negative for behavioral problems           Current Medications Current Outpatient Prescriptions:     b complex vitamins capsule, Take 1 capsule by mouth daily, Disp: , Rfl:     Barberry-Oreg Grape-Goldenseal (BERBERINE COMPLEX PO), Take by mouth, Disp: , Rfl:     cholecalciferol (VITAMIN D3) 1,000 units tablet, Take 1,000 Units by mouth daily, Disp: , Rfl:     Cinnamon 500 MG capsule, Take 500 mg by mouth daily, Disp: , Rfl:     co-enzyme Q-10 30 MG capsule, Take 30 mg by mouth 3 (three) times a day, Disp: , Rfl:     fenofibrate (TRICOR) 145 mg tablet, TAKE 1 TABLET DAILY  , Disp: 90 tablet, Rfl: 3    magnesium 30 MG tablet, Take 30 mg by mouth 2 (two) times a day, Disp: , Rfl:     mupirocin (BACTROBAN) 2 % ointment, Apply topically 2 (two) times a day for 7 days, Disp: 22 g, Rfl: 0    NON FORMULARY, , Disp: , Rfl:     Omega-3 Fatty Acids (FISH OIL) 1,000 mg, Take 1,000 mg by mouth daily, Disp: , Rfl:     rosuvastatin (CRESTOR) 40 MG tablet, Take 40 mg by mouth daily, Disp: , Rfl:     sulfamethoxazole-trimethoprim (BACTRIM DS) 800-160 mg per tablet, Take 1 tablet by mouth every 12 (twelve) hours for 10 days, Disp: 20 tablet, Rfl: 0    tobramycin (TOBREX) 0 3 % OINT, Administer 0 5 inches to the right eye 3 (three) times a day, Disp: 1 Tube, Rfl: 0    Current Allergies     Allergies as of 05/08/2018 - Reviewed 05/08/2018   Allergen Reaction Noted    Penicillins Other (See Comments) 02/24/2016    Vancomycin Rash 02/24/2016            The following portions of the patient's history were reviewed and updated as appropriate: allergies, current medications, past family history, past medical history, past social history, past surgical history and problem list      Past Medical History:   Diagnosis Date    Cellulitis     right calf, above left foot    Colitis     CPAP (continuous positive airway pressure) dependence     Hypercholesteremia     Hyperlipidemia     Sleep apnea     on CPAP at 4 5       Past Surgical History:   Procedure Laterality Date    COLONOSCOPY      HAND SURGERY Bilateral     L index finger, R middle and index finger trigger release    LA COLONOSCOPY FLX DX W/COLLJ SPEC WHEN PFRMD N/A 2/25/2016    Procedure: COLONOSCOPY;  Surgeon: Dariana Tirado MD;  [facility]; Service: Gastroenterology    TRIGGER FINGER RELEASE      WISDOM TOOTH EXTRACTION         Family History   Problem Relation Age of Onset    Coronary artery disease Mother     Other Mother      Malignant neoplasm of esophagus    Coronary artery disease Father     Other Father      Malignant neoplasm of esophagus         Medications have been verified  Objective   /70 (BP Location: Right arm)   Pulse 62   Temp 97 6 °F (36 4 °C) (Temporal)   Resp 20   Ht 6' (1 829 m)   Wt 94 kg (207 lb 4 oz)   SpO2 98%   BMI 28 11 kg/m²        Physical Exam     Physical Exam   Constitutional: He is oriented to person, place, and time  He appears well-developed and well-nourished  No distress  Cardiovascular: Normal rate, regular rhythm and normal heart sounds  Pulmonary/Chest: Effort normal and breath sounds normal  No respiratory distress  Musculoskeletal: He exhibits no tenderness  Neurological: He is alert and oriented to person, place, and time  Skin: No rash noted  Nursing note and vitals reviewed

## 2018-07-06 ENCOUNTER — APPOINTMENT (OUTPATIENT)
Dept: LAB | Facility: MEDICAL CENTER | Age: 48
End: 2018-07-06
Payer: COMMERCIAL

## 2018-07-06 ENCOUNTER — TRANSCRIBE ORDERS (OUTPATIENT)
Dept: ADMINISTRATIVE | Facility: HOSPITAL | Age: 48
End: 2018-07-06

## 2018-07-06 DIAGNOSIS — Z82.49 FAMILY HISTORY OF ISCHEMIC HEART DISEASE: ICD-10-CM

## 2018-07-06 DIAGNOSIS — E66.3 OVERWEIGHT: ICD-10-CM

## 2018-07-06 DIAGNOSIS — E55.9 VITAMIN D DEFICIENCY: ICD-10-CM

## 2018-07-06 DIAGNOSIS — R79.82 ELEVATED C-REACTIVE PROTEIN (CRP): ICD-10-CM

## 2018-07-06 DIAGNOSIS — E78.00 PURE HYPERCHOLESTEROLEMIA: ICD-10-CM

## 2018-07-06 DIAGNOSIS — R79.89 INCREASED HOMOCYSTEINE: ICD-10-CM

## 2018-07-06 DIAGNOSIS — E78.00 PURE HYPERCHOLESTEROLEMIA: Primary | ICD-10-CM

## 2018-07-06 LAB
25(OH)D3 SERPL-MCNC: 30.1 NG/ML (ref 30–100)
ANION GAP SERPL CALCULATED.3IONS-SCNC: 8 MMOL/L (ref 4–13)
BUN SERPL-MCNC: 11 MG/DL (ref 5–25)
CALCIUM SERPL-MCNC: 9.3 MG/DL (ref 8.3–10.1)
CHLORIDE SERPL-SCNC: 106 MMOL/L (ref 100–108)
CHOLEST SERPL-MCNC: 181 MG/DL (ref 50–200)
CO2 SERPL-SCNC: 25 MMOL/L (ref 21–32)
CREAT SERPL-MCNC: 0.88 MG/DL (ref 0.6–1.3)
CRP SERPL HS-MCNC: 1.02 MG/L
EST. AVERAGE GLUCOSE BLD GHB EST-MCNC: 94 MG/DL
GFR SERPL CREATININE-BSD FRML MDRD: 101 ML/MIN/1.73SQ M
GLUCOSE P FAST SERPL-MCNC: 95 MG/DL (ref 65–99)
HBA1C MFR BLD: 4.9 % (ref 4.2–6.3)
HCYS SERPL-SCNC: 9 UMOL/L (ref 5.3–14.2)
HDLC SERPL-MCNC: 45 MG/DL (ref 40–60)
LDLC SERPL CALC-MCNC: 115 MG/DL (ref 0–100)
NONHDLC SERPL-MCNC: 136 MG/DL
POTASSIUM SERPL-SCNC: 4.4 MMOL/L (ref 3.5–5.3)
SODIUM SERPL-SCNC: 139 MMOL/L (ref 136–145)
TRIGL SERPL-MCNC: 104 MG/DL

## 2018-07-06 PROCEDURE — 80048 BASIC METABOLIC PNL TOTAL CA: CPT

## 2018-07-06 PROCEDURE — 86141 C-REACTIVE PROTEIN HS: CPT

## 2018-07-06 PROCEDURE — 82306 VITAMIN D 25 HYDROXY: CPT

## 2018-07-06 PROCEDURE — 83036 HEMOGLOBIN GLYCOSYLATED A1C: CPT | Performed by: FAMILY MEDICINE

## 2018-07-06 PROCEDURE — 83090 ASSAY OF HOMOCYSTEINE: CPT

## 2018-07-06 PROCEDURE — 80061 LIPID PANEL: CPT

## 2018-07-06 PROCEDURE — 36415 COLL VENOUS BLD VENIPUNCTURE: CPT | Performed by: FAMILY MEDICINE

## 2019-08-11 ENCOUNTER — OFFICE VISIT (OUTPATIENT)
Dept: URGENT CARE | Facility: MEDICAL CENTER | Age: 49
End: 2019-08-11
Payer: COMMERCIAL

## 2019-08-11 VITALS
WEIGHT: 206.4 LBS | SYSTOLIC BLOOD PRESSURE: 108 MMHG | OXYGEN SATURATION: 98 % | HEIGHT: 71 IN | HEART RATE: 80 BPM | DIASTOLIC BLOOD PRESSURE: 76 MMHG | BODY MASS INDEX: 28.9 KG/M2 | TEMPERATURE: 98 F

## 2019-08-11 DIAGNOSIS — S80.862A INSECT BITE OF LEFT LOWER EXTREMITY, INITIAL ENCOUNTER: Primary | ICD-10-CM
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
## 2019-08-11 PROCEDURE — 99213 OFFICE O/P EST LOW 20 MIN: CPT | Performed by: PHYSICIAN ASSISTANT

## 2019-08-11 RX ORDER — PREDNISONE 20 MG/1
20 TABLET ORAL 2 TIMES DAILY WITH MEALS
Qty: 10 TABLET | Refills: 0 | Status: SHIPPED | OUTPATIENT
Start: 2019-08-11 | End: 2019-08-16

## 2019-08-11 RX ORDER — DOXYCYCLINE HYCLATE 100 MG/1
100 TABLET, DELAYED RELEASE ORAL 2 TIMES DAILY
Qty: 20 TABLET | Refills: 0 | Status: SHIPPED | OUTPATIENT
Start: 2019-08-11 | End: 2019-08-21

## 2019-08-11 RX ORDER — DOXYCYCLINE HYCLATE 100 MG/1
CAPSULE ORAL
COMMUNITY
Start: 2013-01-02

## 2019-08-12 NOTE — PATIENT INSTRUCTIONS
1  Take Prednisone 20mg  Twice daily x 5 days  2  Continue Doxycycline 100mg  Twice daily x 10 days  3   Follow up with PCP in 3-5 days if symptoms persist

## 2019-08-12 NOTE — PROGRESS NOTES
[facility]        NAME: Lindley Libman is a 47 y o  male  : 1970    MRN: [id number]  DATE: 2019  TIME: 8:04 PM    Assessment and Plan   Insect bite of left lower extremity, initial encounter [S80 862A, W57  XXXA]  1  Insect bite of left lower extremity, initial encounter  predniSONE 20 mg tablet    doxycycline (DORYX) 100 MG EC tablet         Patient Instructions     1  Take Prednisone 20mg  Twice daily x 5 days  2  Continue Doxycycline 100mg  Twice daily x 10 days  3  Follow up with PCP in 3-5 days if symptoms persist       Chief Complaint     Chief Complaint   Patient presents with    Cellulitis     Pt  was stung 2 days ago, and the area has become red and warm  Has started antibiotics today  History of Present Illness       Momo Hdz is a 25-year-old male who presents with redness and swelling of his left leg after insect sting that occurred 2 days prior  Patient was stung with a bee and has been using cool compresses and Benadryl with no improvement of symptoms  Patient does report a history of prior cellulitis and began taking doxycycline 100 mg twice a day with very little improvement of symptoms  He denies any fever, chills or body aches since the onset of symptoms  Review of Systems   Review of Systems   Constitutional: Negative  Skin: Positive for color change and wound           Current Medications       Current Outpatient Medications:     b complex vitamins capsule, Take 1 capsule by mouth daily, Disp: , Rfl:     Barberry-Oreg Grape-Goldenseal (BERBERINE COMPLEX PO), Take by mouth, Disp: , Rfl:     cholecalciferol (VITAMIN D3) 1,000 units tablet, Take 1,000 Units by mouth daily, Disp: , Rfl:     Cinnamon 500 MG capsule, Take 500 mg by mouth daily, Disp: , Rfl:     co-enzyme Q-10 30 MG capsule, Take 30 mg by mouth 3 (three) times a day, Disp: , Rfl:     doxycycline hyclate (VIBRAMYCIN) 100 mg capsule, Take by mouth, Disp: , Rfl:     fenofibrate (TRICOR) 145 mg tablet, TAKE 1 TABLET DAILY  , Disp: 90 tablet, Rfl: 3    magnesium 30 MG tablet, Take 30 mg by mouth 2 (two) times a day, Disp: , Rfl:     NON FORMULARY, , Disp: , Rfl:     Omega-3 Fatty Acids (FISH OIL) 1,000 mg, Take 1,000 mg by mouth daily, Disp: , Rfl:     rosuvastatin (CRESTOR) 40 MG tablet, Take 40 mg by mouth daily, Disp: , Rfl:     doxycycline (DORYX) 100 MG EC tablet, Take 1 tablet (100 mg total) by mouth 2 (two) times a day for 10 days, Disp: 20 tablet, Rfl: 0    mupirocin (BACTROBAN) 2 % ointment, Apply topically 2 (two) times a day for 7 days, Disp: 22 g, Rfl: 0    predniSONE 20 mg tablet, Take 1 tablet (20 mg total) by mouth 2 (two) times a day with meals for 5 days, Disp: 10 tablet, Rfl: 0    tobramycin (TOBREX) 0 3 % OINT, Administer 0 5 inches to the right eye 3 (three) times a day (Patient not taking: Reported on 8/11/2019), Disp: 1 Tube, Rfl: 0    Current Allergies     Allergies as of 08/11/2019 - Reviewed 08/11/2019   Allergen Reaction Noted    Penicillins Other (See Comments) 02/24/2016    Vancomycin Rash 02/24/2016            The following portions of the patient's history were reviewed and updated as appropriate: allergies, current medications, past family history, past medical history, past social history, past surgical history and problem list      Past Medical History:   Diagnosis Date    Cellulitis     right calf, above left foot    Colitis     CPAP (continuous positive airway pressure) dependence     Hypercholesteremia     Hyperlipidemia     Sleep apnea     on CPAP at 4 5       Past Surgical History:   Procedure Laterality Date    COLONOSCOPY      HAND SURGERY Bilateral     L index finger, R middle and index finger trigger release    LA COLONOSCOPY FLX DX W/COLLJ SPEC WHEN PFRMD N/A 2/25/2016    Procedure: COLONOSCOPY;  Surgeon: Deya Ashraf MD;  [facility];   Service: Gastroenterology    TRIGGER FINGER RELEASE      WISDOM TOOTH EXTRACTION Family History   Problem Relation Age of Onset    Coronary artery disease Mother     Other Mother         Malignant neoplasm of esophagus    Coronary artery disease Father     Other Father         Malignant neoplasm of esophagus         Medications have been verified  Objective   /76   Pulse 80   Temp 98 °F (36 7 °C) (Temporal)   Ht 5' 11" (1 803 m)   Wt 93 6 kg (206 lb 6 4 oz)   SpO2 98%   BMI 28 79 kg/m²        Physical Exam     Physical Exam   Constitutional: He appears well-developed and well-nourished  No distress  Cardiovascular: Normal rate, regular rhythm and normal heart sounds  No murmur heard    Pulmonary/Chest: Effort normal and breath sounds normal    Skin:

## 2022-08-08 ENCOUNTER — OFFICE VISIT (OUTPATIENT)
Dept: URGENT CARE | Facility: MEDICAL CENTER | Age: 52
End: 2022-08-08
Payer: COMMERCIAL

## 2022-08-08 VITALS
DIASTOLIC BLOOD PRESSURE: 80 MMHG | SYSTOLIC BLOOD PRESSURE: 153 MMHG | HEIGHT: 71 IN | WEIGHT: 214 LBS | BODY MASS INDEX: 29.96 KG/M2 | TEMPERATURE: 97.5 F | OXYGEN SATURATION: 97 % | RESPIRATION RATE: 18 BRPM | HEART RATE: 76 BPM

## 2022-08-08 DIAGNOSIS — S80.11XA HEMATOMA OF RIGHT LOWER LEG: Primary | ICD-10-CM

## 2022-08-08 PROCEDURE — 99213 OFFICE O/P EST LOW 20 MIN: CPT | Performed by: PHYSICIAN ASSISTANT

## 2022-08-08 RX ORDER — LORAZEPAM 0.5 MG/1
0.5 TABLET ORAL EVERY 6 HOURS PRN
COMMUNITY
Start: 2022-06-28

## 2022-08-08 NOTE — PROGRESS NOTES
[facility]        NAME: John Doshi is a 47 y o  male  : 1970    MRN: [id number]  DATE: 2022  TIME: 3:00 PM    Assessment and Plan   Hematoma of right lower leg [S80 11XA]  1  Hematoma of right lower leg           Patient Instructions     1  Over-the-counter ibuprofen 600 mg every 6-8 hours for any mild discomfort  2  Warm compresses to the affected area 3-4 times daily for 15-20 minutes until symptoms resolved  3  Follow-up with orthopedics for any worsening symptoms  Chief Complaint     Chief Complaint   Patient presents with    bump on leg     Pt  Bumped his right shin about a week ago  Has a painless lump on the shin since  History of Present Illness       50-year-old male patient with a 1 week history of swelling of the right shin after accidentally impacting the area against the wall of the hot tub  Patient denies any persistent pain or disability from it  Just concerned because of the persistent swelling  He does state that the area seems to become smaller and then bigger  No other complaints  Review of Systems   Review of Systems   Constitutional: Negative for chills and fever  HENT: Negative for ear pain and sore throat  Eyes: Negative for pain and visual disturbance  Respiratory: Negative for cough and shortness of breath  Cardiovascular: Negative for chest pain and palpitations  Gastrointestinal: Negative for abdominal pain and vomiting  Genitourinary: Negative for dysuria and hematuria  Musculoskeletal: Negative for arthralgias and back pain  Skin: Negative for color change and rash  Neurological: Negative for seizures and syncope  All other systems reviewed and are negative          Current Medications       Current Outpatient Medications:     b complex vitamins capsule, Take 1 capsule by mouth daily, Disp: , Rfl:     Barberry-Oreg Grape-Goldenseal (BERBERINE COMPLEX PO), Take by mouth, Disp: , Rfl:     cholecalciferol (VITAMIN D3) 1,000 units tablet, Take 1,000 Units by mouth daily, Disp: , Rfl:     Cinnamon 500 MG capsule, Take 500 mg by mouth daily, Disp: , Rfl:     co-enzyme Q-10 30 MG capsule, Take 30 mg by mouth 3 (three) times a day, Disp: , Rfl:     fenofibrate (TRICOR) 145 mg tablet, TAKE 1 TABLET DAILY  , Disp: 90 tablet, Rfl: 3    magnesium 30 MG tablet, Take 30 mg by mouth 2 (two) times a day, Disp: , Rfl:     NON FORMULARY, , Disp: , Rfl:     Omega-3 Fatty Acids (FISH OIL) 1,000 mg, Take 1,000 mg by mouth daily, Disp: , Rfl:     doxycycline hyclate (VIBRAMYCIN) 100 mg capsule, Take by mouth (Patient not taking: Reported on 8/8/2022), Disp: , Rfl:     LORazepam (ATIVAN) 0 5 mg tablet, Take 0 5 mg by mouth every 6 (six) hours as needed, Disp: , Rfl:     mupirocin (BACTROBAN) 2 % ointment, Apply topically 2 (two) times a day for 7 days, Disp: 22 g, Rfl: 0    rosuvastatin (CRESTOR) 40 MG tablet, Take 40 mg by mouth daily (Patient not taking: Reported on 8/8/2022), Disp: , Rfl:     tobramycin (TOBREX) 0 3 % OINT, Administer 0 5 inches to the right eye 3 (three) times a day (Patient not taking: No sig reported), Disp: 1 Tube, Rfl: 0    Current Allergies     Allergies as of 08/08/2022 - Reviewed 08/08/2022   Allergen Reaction Noted    Penicillins Other (See Comments) 02/24/2016    Vancomycin Rash 02/24/2016            The following portions of the patient's history were reviewed and updated as appropriate: allergies, current medications, past family history, past medical history, past social history, past surgical history and problem list      Past Medical History:   Diagnosis Date    Cellulitis     right calf, above left foot    Colitis     CPAP (continuous positive airway pressure) dependence     Hypercholesteremia     Hyperlipidemia     Sleep apnea     on CPAP at 4 5       Past Surgical History:   Procedure Laterality Date    COLONOSCOPY      HAND SURGERY Bilateral     L index finger, R middle and index finger trigger release    RI COLONOSCOPY FLX DX W/COLLJ SPEC WHEN PFRMD N/A 2/25/2016    Procedure: COLONOSCOPY;  Surgeon: Jamie Noe MD;  [facility]; Service: Gastroenterology    TRIGGER FINGER RELEASE      WISDOM TOOTH EXTRACTION         Family History   Problem Relation Age of Onset    Coronary artery disease Mother     Other Mother         Malignant neoplasm of esophagus    Coronary artery disease Father     Other Father         Malignant neoplasm of esophagus         Medications have been verified  Objective   /80   Pulse 76   Temp 97 5 °F (36 4 °C)   Resp 18   Ht 5' 11" (1 803 m)   Wt 97 1 kg (214 lb)   SpO2 97%   BMI 29 85 kg/m²        Physical Exam     Physical Exam  Vitals and nursing note reviewed  Constitutional:       Appearance: Normal appearance  HENT:      Head: Normocephalic  Nose: Nose normal       Mouth/Throat:      Mouth: Mucous membranes are dry  Pharynx: Oropharynx is clear  Eyes:      Conjunctiva/sclera: Conjunctivae normal       Pupils: Pupils are equal, round, and reactive to light  Cardiovascular:      Rate and Rhythm: Normal rate and regular rhythm  Pulses: Normal pulses  Pulmonary:      Effort: Pulmonary effort is normal       Breath sounds: Normal breath sounds  Musculoskeletal:         General: Normal range of motion  Cervical back: Normal range of motion and neck supple  Skin:     General: Skin is warm and dry  Capillary Refill: Capillary refill takes less than 2 seconds  Comments: Localized ping-pong ball sized subcutaneous swelling proximal aspect of the right shin  No ecchymosis noted  No tenderness noted to the area  No open wound  No redness, warmth, red streaking  Knee and ankle are completely unremarkable  Neurological:      General: No focal deficit present  Mental Status: He is alert and oriented to person, place, and time     Psychiatric:         Mood and Affect: Mood normal          Behavior: Behavior normal

## 2022-08-08 NOTE — PATIENT INSTRUCTIONS
1  Over-the-counter ibuprofen 600 mg every 6-8 hours for any mild discomfort  2  Warm compresses to the affected area 3-4 times daily for 15-20 minutes until symptoms resolved  3  Follow-up with orthopedics for any worsening symptoms

## 2023-07-01 ENCOUNTER — OFFICE VISIT (OUTPATIENT)
Dept: URGENT CARE | Facility: MEDICAL CENTER | Age: 53
End: 2023-07-01
Payer: COMMERCIAL

## 2023-07-01 VITALS
HEART RATE: 98 BPM | BODY MASS INDEX: 30.8 KG/M2 | SYSTOLIC BLOOD PRESSURE: 142 MMHG | DIASTOLIC BLOOD PRESSURE: 93 MMHG | OXYGEN SATURATION: 96 % | HEIGHT: 71 IN | TEMPERATURE: 97.6 F | WEIGHT: 220 LBS | RESPIRATION RATE: 18 BRPM

## 2023-07-01 DIAGNOSIS — S20.462A TICK BITE OF LEFT BACK WALL OF THORAX, INITIAL ENCOUNTER: Primary | ICD-10-CM
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
## 2023-07-01 PROCEDURE — 99213 OFFICE O/P EST LOW 20 MIN: CPT | Performed by: PHYSICIAN ASSISTANT

## 2023-07-01 RX ORDER — DOXYCYCLINE 100 MG/1
200 TABLET ORAL ONCE
Qty: 2 TABLET | Refills: 0 | Status: SHIPPED | OUTPATIENT
Start: 2023-07-01 | End: 2023-07-01

## 2023-07-01 NOTE — PROGRESS NOTES
[facility]"[employer] [facility]        NAME: Casey Prado is a 46 y o  male  : 1970    MRN: [id number]  DATE: 2023  TIME: 7:55 PM    Assessment and Plan   Tick bite of left back wall of thorax, initial encounter [S20 462A, W57  XXXA]  1  Tick bite of left back wall of thorax, initial encounter  doxycycline (ADOXA) 100 MG tablet            Patient Instructions     Tick bite left shoulder  Doxycycline 200 mg x 1  Follow up with PCP in 3-5 days  Proceed to  ER if symptoms worsen  Chief Complaint     Chief Complaint   Patient presents with   • Tick Bite     Left shoulder           History of Present Illness       50-year-old male who presents complaining of tick bite that was embedded less than 48 hours  Denies any fevers, chills, rashes  Tick Bite  Pertinent negatives include no chest pain or coughing  Review of Systems   Review of Systems   Constitutional: Negative  HENT: Negative  Eyes: Negative  Respiratory: Negative  Negative for apnea, cough, choking, chest tightness, shortness of breath, wheezing and stridor  Cardiovascular: Negative  Negative for chest pain  Skin: Positive for wound  Current Medications       Current Outpatient Medications:   •  b complex vitamins capsule, Take 1 capsule by mouth daily, Disp: , Rfl:   •  Barberry-Oreg Grape-Goldenseal (BERBERINE COMPLEX PO), Take by mouth, Disp: , Rfl:   •  cholecalciferol (VITAMIN D3) 1,000 units tablet, Take 1,000 Units by mouth daily, Disp: , Rfl:   •  Cinnamon 500 MG capsule, Take 500 mg by mouth daily, Disp: , Rfl:   •  co-enzyme Q-10 30 MG capsule, Take 30 mg by mouth 3 (three) times a day, Disp: , Rfl:   •  doxycycline (ADOXA) 100 MG tablet, Take 2 tablets (200 mg total) by mouth 1 (one) time for 1 dose, Disp: 2 tablet, Rfl: 0  •  fenofibrate (TRICOR) 145 mg tablet, TAKE 1 TABLET DAILY  , Disp: 90 tablet, Rfl: 3  •  LORazepam (ATIVAN) 0 5 mg tablet, Take 0 5 mg by mouth every 6 (six) hours as needed, Disp: , Rfl:   •  magnesium 30 MG tablet, Take 30 mg by mouth 2 (two) times a day, Disp: , Rfl:   •  Omega-3 Fatty Acids (FISH OIL) 1,000 mg, Take 1,000 mg by mouth daily, Disp: , Rfl:   •  doxycycline hyclate (VIBRAMYCIN) 100 mg capsule, Take by mouth (Patient not taking: Reported on 8/8/2022), Disp: , Rfl:   •  mupirocin (BACTROBAN) 2 % ointment, Apply topically 2 (two) times a day for 7 days, Disp: 22 g, Rfl: 0  •  NON FORMULARY, , Disp: , Rfl:   •  rosuvastatin (CRESTOR) 40 MG tablet, Take 40 mg by mouth daily (Patient not taking: Reported on 8/8/2022), Disp: , Rfl:   •  tobramycin (TOBREX) 0 3 % OINT, Administer 0 5 inches to the right eye 3 (three) times a day (Patient not taking: Reported on 8/11/2019), Disp: 1 Tube, Rfl: 0    Current Allergies     Allergies as of 07/01/2023 - Reviewed 07/01/2023   Allergen Reaction Noted   • Penicillins Other (See Comments) 02/24/2016   • Vancomycin Rash 02/24/2016            The following portions of the patient's history were reviewed and updated as appropriate: allergies, current medications, past family history, past medical history, past social history, past surgical history and problem list      Past Medical History:   Diagnosis Date   • Cellulitis     right calf, above left foot   • Colitis    • CPAP (continuous positive airway pressure) dependence    • Hypercholesteremia    • Hyperlipidemia    • Sleep apnea     on CPAP at 4 5       Past Surgical History:   Procedure Laterality Date   • COLONOSCOPY     • HAND SURGERY Bilateral     L index finger, R middle and index finger trigger release   • IN COLONOSCOPY FLX DX W/COLLJ SPEC WHEN PFRMD N/A 2/25/2016    Procedure: COLONOSCOPY;  Surgeon: Odell Hawley MD;  Location: [facility];   Service: Gastroenterology   • TRIGGER FINGER RELEASE     • WISDOM TOOTH EXTRACTION         Family History   Problem Relation Age of Onset   • Coronary artery disease Mother    • Other Mother         Malignant neoplasm of esophagus   • Coronary "artery disease Father    • Other Father         Malignant neoplasm of esophagus         Medications have been verified  Objective   /93   Pulse 98   Temp 97 6 °F (36 4 °C)   Resp 18   Ht 5' 11\" (1 803 m)   Wt 99 8 kg (220 lb)   SpO2 96%   BMI 30 68 kg/m²        Physical Exam     Physical Exam  Constitutional:       General: He is not in acute distress  Appearance: He is well-developed  He is not diaphoretic  Cardiovascular:      Rate and Rhythm: Normal rate and regular rhythm  Heart sounds: Normal heart sounds  Pulmonary:      Effort: Pulmonary effort is normal  No respiratory distress  Breath sounds: Normal breath sounds  No wheezing or rales  Chest:      Chest wall: No tenderness  Musculoskeletal:        Arms:       Cervical back: Normal range of motion and neck supple  Lymphadenopathy:      Cervical: No cervical adenopathy           Tick removed by patient prior to arriving to urgent care        "

## 2023-07-01 NOTE — PATIENT INSTRUCTIONS
Tick bite left shoulder  Doxycycline 200 mg x 1  Follow up with PCP in 3-5 days  Proceed to  ER if symptoms worsen

## 2023-09-27 ENCOUNTER — APPOINTMENT (OUTPATIENT)
Dept: LAB | Facility: MEDICAL CENTER | Age: 53
End: 2023-09-27
Payer: COMMERCIAL

## 2023-09-27 DIAGNOSIS — Z13.6 SCREENING FOR CARDIOVASCULAR CONDITION: ICD-10-CM

## 2023-09-27 DIAGNOSIS — E78.00 HYPERCHOLESTEREMIA: ICD-10-CM

## 2023-09-27 DIAGNOSIS — R73.01 IMPAIRED FASTING GLUCOSE: ICD-10-CM

## 2023-09-27 DIAGNOSIS — Z11.59 NEED FOR HEPATITIS C SCREENING TEST: ICD-10-CM

## 2023-09-27 LAB
ALBUMIN SERPL BCP-MCNC: 4.3 G/DL (ref 3.5–5)
ALP SERPL-CCNC: 49 U/L (ref 34–104)
ALT SERPL W P-5'-P-CCNC: 19 U/L (ref 7–52)
ANION GAP SERPL CALCULATED.3IONS-SCNC: 6 MMOL/L
AST SERPL W P-5'-P-CCNC: 23 U/L (ref 13–39)
BILIRUB SERPL-MCNC: 0.71 MG/DL (ref 0.2–1)
BUN SERPL-MCNC: 13 MG/DL (ref 5–25)
CALCIUM SERPL-MCNC: 9.4 MG/DL (ref 8.4–10.2)
CHLORIDE SERPL-SCNC: 105 MMOL/L (ref 96–108)
CHOLEST SERPL-MCNC: 233 MG/DL
CO2 SERPL-SCNC: 27 MMOL/L (ref 21–32)
CREAT SERPL-MCNC: 0.9 MG/DL (ref 0.6–1.3)
EST. AVERAGE GLUCOSE BLD GHB EST-MCNC: 117 MG/DL
GFR SERPL CREATININE-BSD FRML MDRD: 96 ML/MIN/1.73SQ M
GLUCOSE P FAST SERPL-MCNC: 86 MG/DL (ref 65–99)
HBA1C MFR BLD: 5.7 %
HDLC SERPL-MCNC: 44 MG/DL
LDLC SERPL CALC-MCNC: 128 MG/DL (ref 0–100)
NONHDLC SERPL-MCNC: 189 MG/DL
POTASSIUM SERPL-SCNC: 4.1 MMOL/L (ref 3.5–5.3)
PROT SERPL-MCNC: 7.6 G/DL (ref 6.4–8.4)
SODIUM SERPL-SCNC: 138 MMOL/L (ref 135–147)
TRIGL SERPL-MCNC: 304 MG/DL

## 2023-09-27 PROCEDURE — 80061 LIPID PANEL: CPT

## 2023-09-27 PROCEDURE — 80053 COMPREHEN METABOLIC PANEL: CPT

## 2023-09-27 PROCEDURE — 86803 HEPATITIS C AB TEST: CPT

## 2023-09-27 PROCEDURE — 83036 HEMOGLOBIN GLYCOSYLATED A1C: CPT

## 2023-09-27 PROCEDURE — 36415 COLL VENOUS BLD VENIPUNCTURE: CPT

## 2023-09-28 LAB
HCV AB S/CO SERPL IA: NON REACTIVE
SL AMB INTERPRETATION: NORMAL

## 2024-01-10 ENCOUNTER — OFFICE VISIT (OUTPATIENT)
Dept: URGENT CARE | Facility: MEDICAL CENTER | Age: 54
End: 2024-01-10
Payer: COMMERCIAL

## 2024-01-10 ENCOUNTER — APPOINTMENT (OUTPATIENT)
Dept: RADIOLOGY | Facility: MEDICAL CENTER | Age: 54
End: 2024-01-10
Payer: COMMERCIAL

## 2024-01-10 VITALS
WEIGHT: 215 LBS | DIASTOLIC BLOOD PRESSURE: 74 MMHG | HEIGHT: 71 IN | SYSTOLIC BLOOD PRESSURE: 125 MMHG | RESPIRATION RATE: 20 BRPM | OXYGEN SATURATION: 99 % | TEMPERATURE: 98 F | HEART RATE: 80 BPM | BODY MASS INDEX: 30.1 KG/M2

## 2024-01-10 DIAGNOSIS — J98.01 BRONCHOSPASM: ICD-10-CM

## 2024-01-10 DIAGNOSIS — J40 BRONCHITIS: ICD-10-CM

## 2024-01-10 DIAGNOSIS — J40 BRONCHITIS: Primary | ICD-10-CM

## 2024-01-10 PROCEDURE — 99213 OFFICE O/P EST LOW 20 MIN: CPT | Performed by: PHYSICIAN ASSISTANT

## 2024-01-10 PROCEDURE — 71046 X-RAY EXAM CHEST 2 VIEWS: CPT

## 2024-01-10 RX ORDER — CYCLOBENZAPRINE HCL 10 MG
10 TABLET ORAL 3 TIMES DAILY PRN
COMMUNITY
Start: 2023-11-24

## 2024-01-10 RX ORDER — BENZONATATE 200 MG/1
200 CAPSULE ORAL 3 TIMES DAILY PRN
COMMUNITY
Start: 2024-01-10 | End: 2024-01-17

## 2024-01-10 RX ORDER — GABAPENTIN 800 MG/1
800 TABLET ORAL 3 TIMES DAILY
COMMUNITY
Start: 2023-11-22 | End: 2024-11-21

## 2024-01-10 RX ORDER — ALBUTEROL SULFATE 90 UG/1
2 AEROSOL, METERED RESPIRATORY (INHALATION) EVERY 6 HOURS PRN
Qty: 8.5 G | Refills: 0 | Status: SHIPPED | OUTPATIENT
Start: 2024-01-10

## 2024-01-10 RX ORDER — MELOXICAM 15 MG/1
TABLET ORAL
COMMUNITY
Start: 2023-11-24

## 2024-01-10 RX ORDER — GABAPENTIN 600 MG/1
600 TABLET ORAL 3 TIMES DAILY
COMMUNITY
Start: 2023-11-08

## 2024-01-10 RX ORDER — GABAPENTIN 300 MG/1
300 CAPSULE ORAL 3 TIMES DAILY
COMMUNITY
Start: 2023-11-01

## 2024-01-10 RX ORDER — PREDNISONE 10 MG/1
TABLET ORAL
Qty: 30 TABLET | Refills: 0 | Status: SHIPPED | OUTPATIENT
Start: 2024-01-10

## 2024-01-10 RX ORDER — DOXYCYCLINE 100 MG/1
100 TABLET ORAL 2 TIMES DAILY
Qty: 20 TABLET | Refills: 0 | Status: SHIPPED | OUTPATIENT
Start: 2024-01-10 | End: 2024-01-20

## 2024-01-10 RX ORDER — AZITHROMYCIN 250 MG/1
TABLET, FILM COATED ORAL
COMMUNITY
Start: 2024-01-03

## 2024-01-10 NOTE — PROGRESS NOTES
"[facility]        NAME: Torres Bach is a 53 y.o. male  : 1970    MRN: [id number]  DATE: January 10, 2024  TIME: 10:41 AM    /74   Pulse 80   Temp 98 °F (36.7 °C)   Resp 20   Ht 5' 11\" (1.803 m)   Wt 97.5 kg (215 lb)   SpO2 99%   BMI 29.99 kg/m²     Assessment and Plan   Bronchitis [J40]  1. Bronchitis  XR chest pa & lateral    doxycycline (ADOXA) 100 MG tablet    albuterol (ProAir HFA) 90 mcg/act inhaler    predniSONE 10 mg tablet      2. Bronchospasm  doxycycline (ADOXA) 100 MG tablet    albuterol (ProAir HFA) 90 mcg/act inhaler    predniSONE 10 mg tablet            Patient Instructions       Follow up with PCP in 3-5 days.  Proceed to  ER if symptoms worsen.    Chief Complaint     Chief Complaint   Patient presents with    Cough     Cough, chest congestion; ongoing for approx 3 weeks; tessalon pearls and zpack taken from PCP with little relief; concerned about pneumonia/bronchitis     Shortness of Breath         History of Present Illness       Pt with cough for over 2 weeks  , tessalon not helping , has had zithromax , no help     Cough  Associated symptoms include shortness of breath.   Shortness of Breath  Associated symptoms include coughing.       Review of Systems   Review of Systems   Constitutional: Negative.    HENT: Negative.     Eyes: Negative.    Respiratory:  Positive for cough and shortness of breath.    Cardiovascular: Negative.    Gastrointestinal: Negative.    Endocrine: Negative.    Genitourinary: Negative.    Musculoskeletal: Negative.    Skin: Negative.    Allergic/Immunologic: Negative.    Neurological: Negative.    Hematological: Negative.    Psychiatric/Behavioral: Negative.     All other systems reviewed and are negative.        Current Medications       Current Outpatient Medications:     albuterol (ProAir HFA) 90 mcg/act inhaler, Inhale 2 puffs every 6 (six) hours as needed for wheezing, Disp: 8.5 g, Rfl: 0    azithromycin (ZITHROMAX) 250 mg tablet, TAKE " 2 TABLETS BY MOUTH TODAY, THEN TAKE 1 TABLET DAILY FOR 4 DAYS AS DIRECTED, Disp: , Rfl:     benzonatate (TESSALON) 200 MG capsule, Take 200 mg by mouth Three times daily as needed, Disp: , Rfl:     cyclobenzaprine (FLEXERIL) 10 mg tablet, Take 10 mg by mouth 3 (three) times a day as needed for muscle spasms, Disp: , Rfl:     doxycycline (ADOXA) 100 MG tablet, Take 1 tablet (100 mg total) by mouth 2 (two) times a day for 10 days, Disp: 20 tablet, Rfl: 0    gabapentin (NEURONTIN) 300 mg capsule, Take 300 mg by mouth 3 (three) times a day, Disp: , Rfl:     gabapentin (NEURONTIN) 600 MG tablet, Take 600 mg by mouth 3 (three) times a day, Disp: , Rfl:     gabapentin (NEURONTIN) 800 mg tablet, Take 800 mg by mouth Three times a day, Disp: , Rfl:     meloxicam (MOBIC) 15 mg tablet, TAKE 1 TABLET BY MOUTH DAILY FOR 10 DAYS., Disp: , Rfl:     predniSONE 10 mg tablet, 5 tabs po qd x 2 days then 4 tabs po qd x 2 days then 3 tabs po qd x 2 days then 2 tabs po qd x 2 days then 1 tab po qd x 2 days, Disp: 30 tablet, Rfl: 0    b complex vitamins capsule, Take 1 capsule by mouth daily, Disp: , Rfl:     Barberry-Oreg Grape-Goldenseal (BERBERINE COMPLEX PO), Take by mouth, Disp: , Rfl:     cholecalciferol (VITAMIN D3) 1,000 units tablet, Take 1,000 Units by mouth daily, Disp: , Rfl:     Cinnamon 500 MG capsule, Take 500 mg by mouth daily, Disp: , Rfl:     co-enzyme Q-10 30 MG capsule, Take 30 mg by mouth 3 (three) times a day, Disp: , Rfl:     doxycycline hyclate (VIBRAMYCIN) 100 mg capsule, Take by mouth (Patient not taking: Reported on 8/8/2022), Disp: , Rfl:     fenofibrate (TRICOR) 145 mg tablet, TAKE 1 TABLET DAILY., Disp: 90 tablet, Rfl: 3    LORazepam (ATIVAN) 0.5 mg tablet, Take 0.5 mg by mouth every 6 (six) hours as needed, Disp: , Rfl:     magnesium 30 MG tablet, Take 30 mg by mouth 2 (two) times a day, Disp: , Rfl:     mupirocin (BACTROBAN) 2 % ointment, Apply topically 2 (two) times a day for 7 days, Disp: 22 g, Rfl: 0     "NON FORMULARY, , Disp: , Rfl:     Omega-3 Fatty Acids (FISH OIL) 1,000 mg, Take 1,000 mg by mouth daily, Disp: , Rfl:     rosuvastatin (CRESTOR) 40 MG tablet, Take 40 mg by mouth daily (Patient not taking: Reported on 8/8/2022), Disp: , Rfl:     tobramycin (TOBREX) 0.3 % OINT, Administer 0.5 inches to the right eye 3 (three) times a day (Patient not taking: Reported on 8/11/2019), Disp: 1 Tube, Rfl: 0    Current Allergies     Allergies as of 01/10/2024 - Reviewed 01/10/2024   Allergen Reaction Noted    Penicillins Other (See Comments) 02/24/2016    Vancomycin Rash 02/24/2016            The following portions of the patient's history were reviewed and updated as appropriate: allergies, current medications, past family history, past medical history, past social history, past surgical history and problem list.     Past Medical History:   Diagnosis Date    Cellulitis     right calf, above left foot    Colitis     CPAP (continuous positive airway pressure) dependence     Hypercholesteremia     Hyperlipidemia     Sleep apnea     on CPAP at 4.5       Past Surgical History:   Procedure Laterality Date    COLONOSCOPY      HAND SURGERY Bilateral     L index finger, R middle and index finger trigger release    SC COLONOSCOPY FLX DX W/COLLJ SPEC WHEN PFRMD N/A 2/25/2016    Procedure: COLONOSCOPY;  Surgeon: Shilo Drake MD;  Location: [facility];  Service: Gastroenterology    TRIGGER FINGER RELEASE      WISDOM TOOTH EXTRACTION         Family History   Problem Relation Age of Onset    Coronary artery disease Mother     Other Mother         Malignant neoplasm of esophagus    Coronary artery disease Father     Other Father         Malignant neoplasm of esophagus         Medications have been verified.        Objective   /74   Pulse 80   Temp 98 °F (36.7 °C)   Resp 20   Ht 5' 11\" (1.803 m)   Wt 97.5 kg (215 lb)   SpO2 99%   BMI 29.99 kg/m²        Physical Exam     Physical Exam  Vitals and nursing note reviewed. "   Constitutional:       Appearance: He is obese.      Comments: Pt would like to hold off on covid flu testing    Persistent bronchospasm type cough in office    HENT:      Head: Normocephalic and atraumatic.      Right Ear: Tympanic membrane, ear canal and external ear normal.      Left Ear: Tympanic membrane, ear canal and external ear normal.      Nose: Nose normal.      Mouth/Throat:      Mouth: Mucous membranes are moist.      Pharynx: Oropharynx is clear.   Eyes:      Extraocular Movements: Extraocular movements intact.      Conjunctiva/sclera: Conjunctivae normal.      Pupils: Pupils are equal, round, and reactive to light.   Cardiovascular:      Rate and Rhythm: Normal rate and regular rhythm.      Pulses: Normal pulses.      Heart sounds: Normal heart sounds.   Pulmonary:      Effort: Pulmonary effort is normal.      Comments: Minor rll coarse chani nds  Abdominal:      General: Abdomen is flat. Bowel sounds are normal.      Palpations: Abdomen is soft.   Musculoskeletal:         General: Normal range of motion.      Cervical back: Normal range of motion and neck supple.   Skin:     General: Skin is warm.   Neurological:      Mental Status: He is alert and oriented to person, place, and time.

## 2024-02-19 ENCOUNTER — OFFICE VISIT (OUTPATIENT)
Dept: URGENT CARE | Facility: MEDICAL CENTER | Age: 54
End: 2024-02-19
Payer: COMMERCIAL

## 2024-02-19 VITALS
TEMPERATURE: 98 F | OXYGEN SATURATION: 99 % | DIASTOLIC BLOOD PRESSURE: 84 MMHG | WEIGHT: 215 LBS | HEIGHT: 71 IN | SYSTOLIC BLOOD PRESSURE: 136 MMHG | HEART RATE: 85 BPM | RESPIRATION RATE: 18 BRPM | BODY MASS INDEX: 30.1 KG/M2

## 2024-02-19 DIAGNOSIS — R05.1 ACUTE COUGH: Primary | ICD-10-CM

## 2024-02-19 DIAGNOSIS — R50.9 FEVER, UNSPECIFIED FEVER CAUSE: ICD-10-CM

## 2024-02-19 PROCEDURE — 99213 OFFICE O/P EST LOW 20 MIN: CPT | Performed by: PHYSICIAN ASSISTANT

## 2024-02-19 PROCEDURE — 87636 SARSCOV2 & INF A&B AMP PRB: CPT | Performed by: PHYSICIAN ASSISTANT

## 2024-02-19 RX ORDER — PREDNISONE 20 MG/1
40 TABLET ORAL DAILY
Qty: 10 TABLET | Refills: 0 | Status: SHIPPED | OUTPATIENT
Start: 2024-02-19 | End: 2024-02-24

## 2024-02-19 RX ORDER — BENZONATATE 100 MG/1
100 CAPSULE ORAL 3 TIMES DAILY PRN
Qty: 20 CAPSULE | Refills: 0 | Status: SHIPPED | OUTPATIENT
Start: 2024-02-19

## 2024-02-19 RX ORDER — ALBUTEROL SULFATE 90 UG/1
2 AEROSOL, METERED RESPIRATORY (INHALATION) EVERY 6 HOURS PRN
Qty: 8.5 G | Refills: 0 | Status: SHIPPED | OUTPATIENT
Start: 2024-02-19

## 2024-02-19 RX ORDER — SILDENAFIL 25 MG/1
TABLET, FILM COATED ORAL
COMMUNITY
Start: 2024-01-03

## 2024-02-19 NOTE — PATIENT INSTRUCTIONS
Cough  Proventil 2 puffs every 6 hours as needed  Prednisone once daily x 5 days  Tessalon Perles as needed for cough  Follow up with PCP in 3-5 days.  Proceed to  ER if symptoms worsen.

## 2024-02-19 NOTE — PROGRESS NOTES
[facility]        NAME: Torres Bach is a 54 y.o. male  : 1970    MRN: [id number]  DATE: 2024  TIME: 12:30 PM    Assessment and Plan   Acute cough [R05.1]  1. Acute cough  albuterol (ProAir HFA) 90 mcg/act inhaler    predniSONE 20 mg tablet    benzonatate (TESSALON PERLES) 100 mg capsule      2. Fever, unspecified fever cause  Covid/Flu-Office Collect            Patient Instructions     Cough  Proventil 2 puffs every 6 hours as needed  Prednisone once daily x 5 days  Tessalon Perles as needed for cough  Follow up with PCP in 3-5 days.  Proceed to  ER if symptoms worsen.    Chief Complaint     Chief Complaint   Patient presents with    Cold Like Symptoms     Pt reports with congestion, productive cough, sore throat, fever x5 days.         History of Present Illness       54-year-old male who presents complaining of cough, congestion, wheezing x 3 days.  Patient states he had a fever of 101.  Denies chest pain, shortness of breath.  Did COVID-19 test at home which was negative.  Has been taking over-the-counter medications with minimal relief.        Review of Systems   Review of Systems   Constitutional:  Positive for fever.   HENT:  Positive for congestion. Negative for dental problem, drooling, ear pain, postnasal drip, rhinorrhea, sinus pain, sore throat, trouble swallowing and voice change.    Eyes: Negative.    Respiratory:  Positive for cough and wheezing. Negative for apnea, choking, chest tightness, shortness of breath and stridor.    Cardiovascular: Negative.  Negative for chest pain.         Current Medications       Current Outpatient Medications:     albuterol (ProAir HFA) 90 mcg/act inhaler, Inhale 2 puffs every 6 (six) hours as needed for wheezing, Disp: 8.5 g, Rfl: 0    albuterol (ProAir HFA) 90 mcg/act inhaler, Inhale 2 puffs every 6 (six) hours as needed for wheezing, Disp: 8.5 g, Rfl: 0    b complex vitamins capsule, Take 1 capsule by mouth daily, Disp: , Rfl:      Barberry-Oreg Grape-Goldenseal (BERBERINE COMPLEX PO), Take by mouth, Disp: , Rfl:     benzonatate (TESSALON PERLES) 100 mg capsule, Take 1 capsule (100 mg total) by mouth 3 (three) times a day as needed for cough, Disp: 20 capsule, Rfl: 0    cholecalciferol (VITAMIN D3) 1,000 units tablet, Take 1,000 Units by mouth daily, Disp: , Rfl:     Cinnamon 500 MG capsule, Take 500 mg by mouth daily, Disp: , Rfl:     co-enzyme Q-10 30 MG capsule, Take 30 mg by mouth 3 (three) times a day, Disp: , Rfl:     fenofibrate (TRICOR) 145 mg tablet, TAKE 1 TABLET DAILY., Disp: 90 tablet, Rfl: 3    gabapentin (NEURONTIN) 800 mg tablet, Take 800 mg by mouth Three times a day, Disp: , Rfl:     LORazepam (ATIVAN) 0.5 mg tablet, Take 0.5 mg by mouth every 6 (six) hours as needed, Disp: , Rfl:     magnesium 30 MG tablet, Take 30 mg by mouth 2 (two) times a day, Disp: , Rfl:     NON FORMULARY, , Disp: , Rfl:     Omega-3 Fatty Acids (FISH OIL) 1,000 mg, Take 1,000 mg by mouth daily, Disp: , Rfl:     predniSONE 20 mg tablet, Take 2 tablets (40 mg total) by mouth daily for 5 days, Disp: 10 tablet, Rfl: 0    sildenafil (VIAGRA) 25 MG tablet, , Disp: , Rfl:     azithromycin (ZITHROMAX) 250 mg tablet, TAKE 2 TABLETS BY MOUTH TODAY, THEN TAKE 1 TABLET DAILY FOR 4 DAYS AS DIRECTED (Patient not taking: Reported on 2/19/2024), Disp: , Rfl:     cyclobenzaprine (FLEXERIL) 10 mg tablet, Take 10 mg by mouth 3 (three) times a day as needed for muscle spasms (Patient not taking: Reported on 2/19/2024), Disp: , Rfl:     doxycycline hyclate (VIBRAMYCIN) 100 mg capsule, Take by mouth (Patient not taking: Reported on 8/8/2022), Disp: , Rfl:     gabapentin (NEURONTIN) 300 mg capsule, Take 300 mg by mouth 3 (three) times a day (Patient not taking: Reported on 2/19/2024), Disp: , Rfl:     gabapentin (NEURONTIN) 600 MG tablet, Take 600 mg by mouth 3 (three) times a day (Patient not taking: Reported on 2/19/2024), Disp: , Rfl:     meloxicam (MOBIC) 15 mg tablet,  TAKE 1 TABLET BY MOUTH DAILY FOR 10 DAYS. (Patient not taking: Reported on 2/19/2024), Disp: , Rfl:     mupirocin (BACTROBAN) 2 % ointment, Apply topically 2 (two) times a day for 7 days, Disp: 22 g, Rfl: 0    predniSONE 10 mg tablet, 5 tabs po qd x 2 days then 4 tabs po qd x 2 days then 3 tabs po qd x 2 days then 2 tabs po qd x 2 days then 1 tab po qd x 2 days (Patient not taking: Reported on 2/19/2024), Disp: 30 tablet, Rfl: 0    rosuvastatin (CRESTOR) 40 MG tablet, Take 40 mg by mouth daily (Patient not taking: Reported on 8/8/2022), Disp: , Rfl:     tobramycin (TOBREX) 0.3 % OINT, Administer 0.5 inches to the right eye 3 (three) times a day (Patient not taking: Reported on 8/11/2019), Disp: 1 Tube, Rfl: 0    Current Allergies     Allergies as of 02/19/2024 - Reviewed 02/19/2024   Allergen Reaction Noted    Penicillins Other (See Comments) 02/24/2016    Vancomycin Rash 02/24/2016            The following portions of the patient's history were reviewed and updated as appropriate: allergies, current medications, past family history, past medical history, past social history, past surgical history and problem list.     Past Medical History:   Diagnosis Date    Cellulitis     right calf, above left foot    Colitis     CPAP (continuous positive airway pressure) dependence     Hypercholesteremia     Hyperlipidemia     Sleep apnea     on CPAP at 4.5       Past Surgical History:   Procedure Laterality Date    BACK SURGERY  12/18/2023    COLONOSCOPY      HAND SURGERY Bilateral     L index finger, R middle and index finger trigger release    SC COLONOSCOPY FLX DX W/COLLJ SPEC WHEN PFRMD N/A 02/25/2016    Procedure: COLONOSCOPY;  Surgeon: Shilo Drake MD;  Location: [facility];  Service: Gastroenterology    TRIGGER FINGER RELEASE      WISDOM TOOTH EXTRACTION         Family History   Problem Relation Age of Onset    Coronary artery disease Mother     Other Mother         Malignant neoplasm of esophagus    Coronary  "artery disease Father     Other Father         Malignant neoplasm of esophagus         Medications have been verified.        Objective   /84   Pulse 85   Temp 98 °F (36.7 °C) (Temporal)   Resp 18   Ht 5' 11\" (1.803 m)   Wt 97.5 kg (215 lb)   SpO2 99%   BMI 29.99 kg/m²        Physical Exam     Physical Exam  Constitutional:       General: He is not in acute distress.     Appearance: Normal appearance. He is well-developed. He is not diaphoretic.   HENT:      Head: Normocephalic and atraumatic.      Right Ear: Hearing, tympanic membrane, ear canal and external ear normal.      Left Ear: Hearing, tympanic membrane, ear canal and external ear normal.      Nose: Rhinorrhea present.      Right Sinus: No maxillary sinus tenderness or frontal sinus tenderness.      Left Sinus: No maxillary sinus tenderness or frontal sinus tenderness.      Mouth/Throat:      Pharynx: Uvula midline.   Cardiovascular:      Rate and Rhythm: Normal rate and regular rhythm.      Heart sounds: Normal heart sounds.   Pulmonary:      Effort: Pulmonary effort is normal. No respiratory distress.      Breath sounds: Normal breath sounds. No stridor. No wheezing, rhonchi or rales.   Chest:      Chest wall: No tenderness.   Musculoskeletal:      Cervical back: Normal range of motion and neck supple.   Lymphadenopathy:      Cervical: No cervical adenopathy.   Neurological:      Mental Status: He is alert.                   "

## 2024-02-20 LAB
FLUAV RNA RESP QL NAA+PROBE: POSITIVE
FLUBV RNA RESP QL NAA+PROBE: NEGATIVE
SARS-COV-2 RNA RESP QL NAA+PROBE: NEGATIVE

## 2024-02-25 ENCOUNTER — APPOINTMENT (OUTPATIENT)
Dept: RADIOLOGY | Facility: MEDICAL CENTER | Age: 54
End: 2024-02-25
Attending: PHYSICIAN ASSISTANT
Payer: COMMERCIAL

## 2024-02-25 ENCOUNTER — OFFICE VISIT (OUTPATIENT)
Dept: URGENT CARE | Facility: MEDICAL CENTER | Age: 54
End: 2024-02-25
Payer: COMMERCIAL

## 2024-02-25 VITALS
BODY MASS INDEX: 30.1 KG/M2 | DIASTOLIC BLOOD PRESSURE: 82 MMHG | OXYGEN SATURATION: 98 % | RESPIRATION RATE: 20 BRPM | HEART RATE: 112 BPM | WEIGHT: 215 LBS | TEMPERATURE: 98.7 F | HEIGHT: 71 IN | SYSTOLIC BLOOD PRESSURE: 131 MMHG

## 2024-02-25 DIAGNOSIS — R05.3 PERSISTENT COUGH: ICD-10-CM

## 2024-02-25 DIAGNOSIS — R05.3 PERSISTENT COUGH: Primary | ICD-10-CM

## 2024-02-25 DIAGNOSIS — J20.9 ACUTE BRONCHITIS, UNSPECIFIED ORGANISM: ICD-10-CM

## 2024-02-25 PROCEDURE — 99213 OFFICE O/P EST LOW 20 MIN: CPT | Performed by: PHYSICIAN ASSISTANT

## 2024-02-25 PROCEDURE — 71046 X-RAY EXAM CHEST 2 VIEWS: CPT

## 2024-02-25 RX ORDER — FLUTICASONE PROPIONATE AND SALMETEROL 250; 50 UG/1; UG/1
1 POWDER RESPIRATORY (INHALATION) 2 TIMES DAILY
Qty: 60 BLISTER | Refills: 0 | Status: SHIPPED | OUTPATIENT
Start: 2024-02-25 | End: 2024-03-26

## 2024-02-25 NOTE — PROGRESS NOTES
[facility]        NAME: Torres Bach is a 54 y.o. male  : 1970    MRN: [id number]  DATE: 2024  TIME: 12:42 PM    Assessment and Plan   Persistent cough [R05.3]  1. Persistent cough  XR chest pa & lateral      2. Acute bronchitis, unspecified organism  Fluticasone-Salmeterol (Advair Diskus) 250-50 mcg/dose inhaler            Patient Instructions     Continue Albuterol 2 puffs every 4-6 hours as needed   Start Advair 250/50- 1 inhalation twice daily  Follow-up with PCP if symptoms persist.       Chief Complaint     Chief Complaint   Patient presents with    Cough    Shortness of Breath     Cough, shortness of breath worse with exertion; ongoing for two weeks; patient states he tested positive for FLU A and is concerned that something is now in his chest; worried about pneumonia     Medication Refill     Requesting albuterol inhaler refill          History of Present Illness       Tamia 54-year-old male who presents with a 2-week history of persistent cough.  Patient reports the onset of his symptoms initially occurred with chills, body aches nasal discharge and fever.  He was evaluated tested positive for influenza A and placed on albuterol and prednisone.  Patient reports he had some relief of the cough but it worsened over the past several days.  He denies any new fever but has had shortness of breath and chest tightness.  Patient has no prior history of allergies or asthma.    Cough  Associated symptoms include rhinorrhea and shortness of breath.   Shortness of Breath  Associated symptoms include coughing and rhinorrhea.   Medication Refill  Associated symptoms include congestion and coughing.       Review of Systems   Review of Systems   Constitutional: Negative.    HENT:  Positive for congestion and rhinorrhea.    Respiratory:  Positive for cough and shortness of breath.    Gastrointestinal: Negative.          Current Medications       Current Outpatient Medications:      Fluticasone-Salmeterol (Advair Diskus) 250-50 mcg/dose inhaler, Inhale 1 puff 2 (two) times a day Rinse mouth after use., Disp: 60 blister, Rfl: 0    albuterol (ProAir HFA) 90 mcg/act inhaler, Inhale 2 puffs every 6 (six) hours as needed for wheezing, Disp: 8.5 g, Rfl: 0    albuterol (ProAir HFA) 90 mcg/act inhaler, Inhale 2 puffs every 6 (six) hours as needed for wheezing, Disp: 8.5 g, Rfl: 0    azithromycin (ZITHROMAX) 250 mg tablet, TAKE 2 TABLETS BY MOUTH TODAY, THEN TAKE 1 TABLET DAILY FOR 4 DAYS AS DIRECTED (Patient not taking: Reported on 2/19/2024), Disp: , Rfl:     b complex vitamins capsule, Take 1 capsule by mouth daily, Disp: , Rfl:     Barberry-Oreg Grape-Goldenseal (BERBERINE COMPLEX PO), Take by mouth, Disp: , Rfl:     benzonatate (TESSALON PERLES) 100 mg capsule, Take 1 capsule (100 mg total) by mouth 3 (three) times a day as needed for cough, Disp: 20 capsule, Rfl: 0    cholecalciferol (VITAMIN D3) 1,000 units tablet, Take 1,000 Units by mouth daily, Disp: , Rfl:     Cinnamon 500 MG capsule, Take 500 mg by mouth daily, Disp: , Rfl:     co-enzyme Q-10 30 MG capsule, Take 30 mg by mouth 3 (three) times a day, Disp: , Rfl:     cyclobenzaprine (FLEXERIL) 10 mg tablet, Take 10 mg by mouth 3 (three) times a day as needed for muscle spasms (Patient not taking: Reported on 2/19/2024), Disp: , Rfl:     doxycycline hyclate (VIBRAMYCIN) 100 mg capsule, Take by mouth (Patient not taking: Reported on 8/8/2022), Disp: , Rfl:     fenofibrate (TRICOR) 145 mg tablet, TAKE 1 TABLET DAILY., Disp: 90 tablet, Rfl: 3    gabapentin (NEURONTIN) 300 mg capsule, Take 300 mg by mouth 3 (three) times a day (Patient not taking: Reported on 2/19/2024), Disp: , Rfl:     gabapentin (NEURONTIN) 600 MG tablet, Take 600 mg by mouth 3 (three) times a day (Patient not taking: Reported on 2/19/2024), Disp: , Rfl:     gabapentin (NEURONTIN) 800 mg tablet, Take 800 mg by mouth Three times a day, Disp: , Rfl:     LORazepam (ATIVAN) 0.5 mg  tablet, Take 0.5 mg by mouth every 6 (six) hours as needed, Disp: , Rfl:     magnesium 30 MG tablet, Take 30 mg by mouth 2 (two) times a day, Disp: , Rfl:     meloxicam (MOBIC) 15 mg tablet, TAKE 1 TABLET BY MOUTH DAILY FOR 10 DAYS. (Patient not taking: Reported on 2/19/2024), Disp: , Rfl:     mupirocin (BACTROBAN) 2 % ointment, Apply topically 2 (two) times a day for 7 days, Disp: 22 g, Rfl: 0    NON FORMULARY, , Disp: , Rfl:     Omega-3 Fatty Acids (FISH OIL) 1,000 mg, Take 1,000 mg by mouth daily, Disp: , Rfl:     predniSONE 10 mg tablet, 5 tabs po qd x 2 days then 4 tabs po qd x 2 days then 3 tabs po qd x 2 days then 2 tabs po qd x 2 days then 1 tab po qd x 2 days (Patient not taking: Reported on 2/19/2024), Disp: 30 tablet, Rfl: 0    rosuvastatin (CRESTOR) 40 MG tablet, Take 40 mg by mouth daily (Patient not taking: Reported on 8/8/2022), Disp: , Rfl:     sildenafil (VIAGRA) 25 MG tablet, , Disp: , Rfl:     tobramycin (TOBREX) 0.3 % OINT, Administer 0.5 inches to the right eye 3 (three) times a day (Patient not taking: Reported on 8/11/2019), Disp: 1 Tube, Rfl: 0    Current Allergies     Allergies as of 02/25/2024 - Reviewed 02/25/2024   Allergen Reaction Noted    Penicillins Other (See Comments) 02/24/2016    Vancomycin Rash 02/24/2016            The following portions of the patient's history were reviewed and updated as appropriate: allergies, current medications, past family history, past medical history, past social history, past surgical history and problem list.     Past Medical History:   Diagnosis Date    Cellulitis     right calf, above left foot    Colitis     CPAP (continuous positive airway pressure) dependence     Hypercholesteremia     Hyperlipidemia     Sleep apnea     on CPAP at 4.5       Past Surgical History:   Procedure Laterality Date    BACK SURGERY  12/18/2023    COLONOSCOPY      HAND SURGERY Bilateral     L index finger, R middle and index finger trigger release    MD COLONOSCOPY FLX DX  "W/COLLJ SPEC WHEN PFRMD N/A 02/25/2016    Procedure: COLONOSCOPY;  Surgeon: Shilo Drake MD;  [facility];  Service: Gastroenterology    TRIGGER FINGER RELEASE      WISDOM TOOTH EXTRACTION         Family History   Problem Relation Age of Onset    Coronary artery disease Mother     Other Mother         Malignant neoplasm of esophagus    Coronary artery disease Father     Other Father         Malignant neoplasm of esophagus         Medications have been verified.        Objective   /82   Pulse (!) 112   Temp 98.7 °F (37.1 °C) (Temporal)   Resp 20   Ht 5' 11\" (1.803 m)   Wt 97.5 kg (215 lb)   SpO2 98%   BMI 29.99 kg/m²   No LMP for male patient.       Physical Exam     Physical Exam  Constitutional:       General: He is not in acute distress.     Appearance: He is well-developed. He is not ill-appearing.   HENT:      Head: Normocephalic and atraumatic.      Right Ear: Tympanic membrane and ear canal normal.      Left Ear: Tympanic membrane and ear canal normal.      Nose: Congestion and rhinorrhea present. Rhinorrhea is clear.      Mouth/Throat:      Lips: Pink.      Pharynx: Oropharynx is clear.   Cardiovascular:      Rate and Rhythm: Normal rate and regular rhythm.      Heart sounds: Normal heart sounds, S1 normal and S2 normal. No murmur heard.  Pulmonary:      Breath sounds: Examination of the right-lower field reveals wheezing and rhonchi. Wheezing and rhonchi present.   Neurological:      Mental Status: He is alert.         Preliminary review of chest x-ray reveals no acute consolidations or visible pneumonia.  Final results as per radiology review.          "

## 2024-03-17 DIAGNOSIS — R05.1 ACUTE COUGH: ICD-10-CM

## 2024-03-18 RX ORDER — ALBUTEROL SULFATE 90 UG/1
AEROSOL, METERED RESPIRATORY (INHALATION)
Qty: 6.7 G | Refills: 0 | Status: SHIPPED | OUTPATIENT
Start: 2024-03-18

## 2024-06-14 ENCOUNTER — OFFICE VISIT (OUTPATIENT)
Dept: URGENT CARE | Facility: MEDICAL CENTER | Age: 54
End: 2024-06-14
Payer: COMMERCIAL

## 2024-06-14 VITALS
DIASTOLIC BLOOD PRESSURE: 87 MMHG | RESPIRATION RATE: 18 BRPM | OXYGEN SATURATION: 96 % | WEIGHT: 224 LBS | HEART RATE: 82 BPM | TEMPERATURE: 98.1 F | SYSTOLIC BLOOD PRESSURE: 130 MMHG | BODY MASS INDEX: 31.24 KG/M2

## 2024-06-14 DIAGNOSIS — L25.5 RHUS DERMATITIS: Primary | ICD-10-CM

## 2024-06-14 PROCEDURE — S9083 URGENT CARE CENTER GLOBAL: HCPCS

## 2024-06-14 PROCEDURE — G0382 LEV 3 HOSP TYPE B ED VISIT: HCPCS

## 2024-06-14 RX ORDER — TRIAMCINOLONE ACETONIDE 5 MG/G
CREAM TOPICAL 2 TIMES DAILY
Qty: 15 G | Refills: 1 | Status: SHIPPED | OUTPATIENT
Start: 2024-06-14

## 2024-06-14 RX ORDER — METHYLPREDNISOLONE 4 MG/1
TABLET ORAL
Qty: 1 EACH | Refills: 0 | Status: SHIPPED | OUTPATIENT
Start: 2024-06-14

## 2024-06-14 RX ORDER — ROSUVASTATIN CALCIUM 10 MG/1
TABLET, COATED ORAL
COMMUNITY
Start: 2024-06-12

## 2024-06-14 NOTE — PROGRESS NOTES
[facility]        NAME: Torres Bach is a 54 y.o. male  : 1970    MRN: [id number]  DATE: 2024  TIME: 5:54 PM    Assessment and Plan   Rhus dermatitis [L25.5]  1. Rhus dermatitis  methylPREDNISolone 4 MG tablet therapy pack    triamcinolone (KENALOG) 0.5 % cream            Patient Instructions   Take steroids as prescribed.   Recommend to take them with food  Do not take Ibuprofen while on steroids.   May take Acetaminophen for pain.     Scrub areas exposed to poison plants with washcloth and Dena soap after initial contact  Recommend applying over-the-counter hydrocortisone cream or prescribed kenalog cream to affected areas (do not use near your eye)  Good hand hygiene  Avoid scratching area  May take oatmeal bath  Keep area clean and dry  Cool compresses  OTC non-drowsy antihistamine during the day such as Zyrtec OR Allegra OR Claritin  Oral benadryl for itching as needed at night, it can make you drowsy  Watch for signs of infection    If you develop any increased redness, rash is spreading, facial swelling, shortness of breath, difficulty breathing, fever, any new or concerning symptoms please proceed ER.    Follow up with PCP in 3-5 days    If tests are performed, our office will contact you with results only if changes need to made to the care plan discussed with you at the visit. You can review your full results on [facility].    Chief Complaint     Chief Complaint   Patient presents with    Rash     Poison rash that began yesterday.          History of Present Illness       HPI    Review of Systems   Review of Systems      Current Medications       Current Outpatient Medications:     b complex vitamins capsule, Take 1 capsule by mouth daily, Disp: , Rfl:     Barberry-Oreg Grape-Goldenseal (BERBERINE COMPLEX PO), Take by mouth, Disp: , Rfl:     cholecalciferol (VITAMIN D3) 1,000 units tablet, Take 1,000 Units by mouth daily, Disp: , Rfl:     Cinnamon 500 MG capsule,  Take 500 mg by mouth daily, Disp: , Rfl:     co-enzyme Q-10 30 MG capsule, Take 30 mg by mouth 3 (three) times a day, Disp: , Rfl:     fenofibrate (TRICOR) 145 mg tablet, TAKE 1 TABLET DAILY., Disp: 90 tablet, Rfl: 3    LORazepam (ATIVAN) 0.5 mg tablet, Take 0.5 mg by mouth every 6 (six) hours as needed, Disp: , Rfl:     magnesium 30 MG tablet, Take 30 mg by mouth 2 (two) times a day, Disp: , Rfl:     methylPREDNISolone 4 MG tablet therapy pack, Use as directed on package, Disp: 1 each, Rfl: 0    NON FORMULARY, , Disp: , Rfl:     Omega-3 Fatty Acids (FISH OIL) 1,000 mg, Take 1,000 mg by mouth daily, Disp: , Rfl:     rosuvastatin (CRESTOR) 10 MG tablet, , Disp: , Rfl:     triamcinolone (KENALOG) 0.5 % cream, Apply topically 2 (two) times a day, Disp: 15 g, Rfl: 1    albuterol (ProAir HFA) 90 mcg/act inhaler, Inhale 2 puffs every 6 (six) hours as needed for wheezing (Patient not taking: Reported on 6/14/2024), Disp: 8.5 g, Rfl: 0    albuterol (PROVENTIL HFA,VENTOLIN HFA) 90 mcg/act inhaler, INHALE 2 PUFFS EVERY 6 HOURS AS NEEDED FOR WHEEZING (Patient not taking: Reported on 6/14/2024), Disp: 6.7 g, Rfl: 0    azithromycin (ZITHROMAX) 250 mg tablet, TAKE 2 TABLETS BY MOUTH TODAY, THEN TAKE 1 TABLET DAILY FOR 4 DAYS AS DIRECTED (Patient not taking: Reported on 2/19/2024), Disp: , Rfl:     benzonatate (TESSALON PERLES) 100 mg capsule, Take 1 capsule (100 mg total) by mouth 3 (three) times a day as needed for cough (Patient not taking: Reported on 6/14/2024), Disp: 20 capsule, Rfl: 0    cyclobenzaprine (FLEXERIL) 10 mg tablet, Take 10 mg by mouth 3 (three) times a day as needed for muscle spasms (Patient not taking: Reported on 2/19/2024), Disp: , Rfl:     doxycycline hyclate (VIBRAMYCIN) 100 mg capsule, Take by mouth (Patient not taking: Reported on 8/8/2022), Disp: , Rfl:     Fluticasone-Salmeterol (Advair Diskus) 250-50 mcg/dose inhaler, Inhale 1 puff 2 (two) times a day Rinse mouth after use. (Patient not taking:  Reported on 6/14/2024), Disp: 60 blister, Rfl: 0    gabapentin (NEURONTIN) 300 mg capsule, Take 300 mg by mouth 3 (three) times a day (Patient not taking: Reported on 2/19/2024), Disp: , Rfl:     gabapentin (NEURONTIN) 600 MG tablet, Take 600 mg by mouth 3 (three) times a day (Patient not taking: Reported on 2/19/2024), Disp: , Rfl:     gabapentin (NEURONTIN) 800 mg tablet, Take 800 mg by mouth Three times a day (Patient not taking: Reported on 6/14/2024), Disp: , Rfl:     meloxicam (MOBIC) 15 mg tablet, TAKE 1 TABLET BY MOUTH DAILY FOR 10 DAYS. (Patient not taking: Reported on 2/19/2024), Disp: , Rfl:     mupirocin (BACTROBAN) 2 % ointment, Apply topically 2 (two) times a day for 7 days, Disp: 22 g, Rfl: 0    rosuvastatin (CRESTOR) 40 MG tablet, Take 40 mg by mouth daily (Patient not taking: Reported on 8/8/2022), Disp: , Rfl:     sildenafil (VIAGRA) 25 MG tablet, , Disp: , Rfl:     tobramycin (TOBREX) 0.3 % OINT, Administer 0.5 inches to the right eye 3 (three) times a day (Patient not taking: Reported on 8/11/2019), Disp: 1 Tube, Rfl: 0    Current Allergies     Allergies as of 06/14/2024 - Reviewed 06/14/2024   Allergen Reaction Noted    Penicillins Other (See Comments) 02/24/2016    Vancomycin Rash 02/24/2016            The following portions of the patient's history were reviewed and updated as appropriate: allergies, current medications, past family history, past medical history, past social history, past surgical history and problem list.     Past Medical History:   Diagnosis Date    Cellulitis     right calf, above left foot    Colitis     CPAP (continuous positive airway pressure) dependence     Hypercholesteremia     Hyperlipidemia     Sleep apnea     on CPAP at 4.5       Past Surgical History:   Procedure Laterality Date    BACK SURGERY  12/18/2023    COLONOSCOPY      HAND SURGERY Bilateral     L index finger, R middle and index finger trigger release    CT COLONOSCOPY FLX DX W/COLLJ SPEC WHEN PFRMD N/A  02/25/2016    Procedure: COLONOSCOPY;  Surgeon: Shilo Drake MD;  [facility];  Service: Gastroenterology    TRIGGER FINGER RELEASE      WISDOM TOOTH EXTRACTION         Family History   Problem Relation Age of Onset    Coronary artery disease Mother     Other Mother         Malignant neoplasm of esophagus    Coronary artery disease Father     Other Father         Malignant neoplasm of esophagus         Medications have been verified.        Objective   /87   Pulse 82   Temp 98.1 °F (36.7 °C)   Resp 18   Wt 102 kg (224 lb)   SpO2 96%   BMI 31.24 kg/m²        Physical Exam     Physical Exam               respiratory distress.      Breath sounds: Normal breath sounds. No stridor. No wheezing, rhonchi or rales.   Chest:      Chest wall: No tenderness.   Musculoskeletal:         General: Normal range of motion.   Skin:     General: Skin is warm.      Findings: Erythema and rash present. Rash is vesicular.      Comments: Scattered, vesicular, clustered erythematous rash noted on BL UE and BL LE   Neurological:      Mental Status: He is alert.

## 2024-06-14 NOTE — PATIENT INSTRUCTIONS
Take steroids as prescribed.   Recommend to take them with food  Do not take Ibuprofen while on steroids.   May take Acetaminophen for pain.     Scrub areas exposed to poison plants with washcloth and Dena soap after initial contact  Recommend applying over-the-counter hydrocortisone cream or prescribed kenalog cream to affected areas (do not use near your eye)  Good hand hygiene  Avoid scratching area  May take oatmeal bath  Keep area clean and dry  Cool compresses  OTC non-drowsy antihistamine during the day such as Zyrtec OR Allegra OR Claritin  Oral benadryl for itching as needed at night, it can make you drowsy  Watch for signs of infection    If you develop any increased redness, rash is spreading, facial swelling, shortness of breath, difficulty breathing, fever, any new or concerning symptoms please proceed ER.    Follow up with PCP in 3-5 days    If tests are performed, our office will contact you with results only if changes need to made to the care plan discussed with you at the visit. You can review your full results on [facility].

## 2024-08-26 ENCOUNTER — OFFICE VISIT (OUTPATIENT)
Dept: URGENT CARE | Facility: MEDICAL CENTER | Age: 54
End: 2024-08-26
Payer: COMMERCIAL

## 2024-08-26 VITALS
DIASTOLIC BLOOD PRESSURE: 82 MMHG | RESPIRATION RATE: 18 BRPM | TEMPERATURE: 97.8 F | SYSTOLIC BLOOD PRESSURE: 118 MMHG | HEART RATE: 65 BPM | OXYGEN SATURATION: 97 %

## 2024-08-26 DIAGNOSIS — L23.7 POISON IVY: Primary | ICD-10-CM

## 2024-08-26 PROCEDURE — S9083 URGENT CARE CENTER GLOBAL: HCPCS | Performed by: PHYSICIAN ASSISTANT

## 2024-08-26 PROCEDURE — G0383 LEV 4 HOSP TYPE B ED VISIT: HCPCS | Performed by: PHYSICIAN ASSISTANT

## 2024-08-26 RX ORDER — HYDROCORTISONE 2.5 %
CREAM (GRAM) TOPICAL 2 TIMES DAILY
Qty: 28 G | Refills: 0 | Status: SHIPPED | OUTPATIENT
Start: 2024-08-26

## 2024-08-26 RX ORDER — PREDNISONE 10 MG/1
TABLET ORAL
Qty: 30 TABLET | Refills: 0 | Status: SHIPPED | OUTPATIENT
Start: 2024-08-26

## 2024-08-26 NOTE — PROGRESS NOTES
[facility]        NAME: Torres Bach is a 54 y.o. male  : 1970    MRN: [id number]  DATE: 2024  TIME: 12:43 PM    /82   Pulse 65   Temp 97.8 °F (36.6 °C)   Resp 18   SpO2 97%     Assessment and Plan   Poison ivy [L23.7]  1. Poison ivy  predniSONE 10 mg tablet    hydrocortisone 2.5 % cream            Patient Instructions       Follow up with PCP in 3-5 days.  Proceed to  ER if symptoms worsen.    Chief Complaint     Chief Complaint   Patient presents with    Poison Ivy     Noted today, no interventions.         History of Present Illness       Pt with itching rash poison ivy to bilat arms and neck     Poison Ivy        Review of Systems   Review of Systems   Constitutional: Negative.    HENT: Negative.     Eyes: Negative.    Respiratory: Negative.     Cardiovascular: Negative.    Gastrointestinal: Negative.    Endocrine: Negative.    Genitourinary: Negative.    Musculoskeletal: Negative.    Skin:  Positive for rash.   Allergic/Immunologic: Negative.    Neurological: Negative.    Hematological: Negative.    Psychiatric/Behavioral: Negative.     All other systems reviewed and are negative.        Current Medications       Current Outpatient Medications:     hydrocortisone 2.5 % cream, Apply topically 2 (two) times a day, Disp: 28 g, Rfl: 0    LORazepam (ATIVAN) 0.5 mg tablet, Take 0.5 mg by mouth every 6 (six) hours as needed, Disp: , Rfl:     predniSONE 10 mg tablet, 5 tabs po qd x 2 days then 4 tabs po qd x 2 days then 3 tabs po qd x 2 days then 2 tabs po qd x 2 days then 1 tab po qd x 2 days, Disp: 30 tablet, Rfl: 0    rosuvastatin (CRESTOR) 10 MG tablet, , Disp: , Rfl:     sildenafil (VIAGRA) 25 MG tablet, , Disp: , Rfl:     albuterol (ProAir HFA) 90 mcg/act inhaler, Inhale 2 puffs every 6 (six) hours as needed for wheezing (Patient not taking: Reported on 2024), Disp: 8.5 g, Rfl: 0    albuterol (PROVENTIL HFA,VENTOLIN HFA) 90 mcg/act inhaler, INHALE 2 PUFFS EVERY 6  HOURS AS NEEDED FOR WHEEZING (Patient not taking: Reported on 6/14/2024), Disp: 6.7 g, Rfl: 0    azithromycin (ZITHROMAX) 250 mg tablet, TAKE 2 TABLETS BY MOUTH TODAY, THEN TAKE 1 TABLET DAILY FOR 4 DAYS AS DIRECTED (Patient not taking: Reported on 2/19/2024), Disp: , Rfl:     b complex vitamins capsule, Take 1 capsule by mouth daily (Patient not taking: Reported on 8/26/2024), Disp: , Rfl:     Barberry-Oreg Grape-Goldenseal (BERBERINE COMPLEX PO), Take by mouth (Patient not taking: Reported on 8/26/2024), Disp: , Rfl:     benzonatate (TESSALON PERLES) 100 mg capsule, Take 1 capsule (100 mg total) by mouth 3 (three) times a day as needed for cough (Patient not taking: Reported on 6/14/2024), Disp: 20 capsule, Rfl: 0    cholecalciferol (VITAMIN D3) 1,000 units tablet, Take 1,000 Units by mouth daily (Patient not taking: Reported on 8/26/2024), Disp: , Rfl:     Cinnamon 500 MG capsule, Take 500 mg by mouth daily (Patient not taking: Reported on 8/26/2024), Disp: , Rfl:     co-enzyme Q-10 30 MG capsule, Take 30 mg by mouth 3 (three) times a day (Patient not taking: Reported on 8/26/2024), Disp: , Rfl:     cyclobenzaprine (FLEXERIL) 10 mg tablet, Take 10 mg by mouth 3 (three) times a day as needed for muscle spasms (Patient not taking: Reported on 2/19/2024), Disp: , Rfl:     doxycycline hyclate (VIBRAMYCIN) 100 mg capsule, Take by mouth (Patient not taking: Reported on 8/8/2022), Disp: , Rfl:     fenofibrate (TRICOR) 145 mg tablet, TAKE 1 TABLET DAILY. (Patient not taking: Reported on 8/26/2024), Disp: 90 tablet, Rfl: 3    Fluticasone-Salmeterol (Advair Diskus) 250-50 mcg/dose inhaler, Inhale 1 puff 2 (two) times a day Rinse mouth after use. (Patient not taking: Reported on 6/14/2024), Disp: 60 blister, Rfl: 0    gabapentin (NEURONTIN) 300 mg capsule, Take 300 mg by mouth 3 (three) times a day (Patient not taking: Reported on 2/19/2024), Disp: , Rfl:     gabapentin (NEURONTIN) 600 MG tablet, Take 600 mg by mouth 3  (three) times a day (Patient not taking: Reported on 2/19/2024), Disp: , Rfl:     gabapentin (NEURONTIN) 800 mg tablet, Take 800 mg by mouth Three times a day (Patient not taking: Reported on 6/14/2024), Disp: , Rfl:     magnesium 30 MG tablet, Take 30 mg by mouth 2 (two) times a day, Disp: , Rfl:     meloxicam (MOBIC) 15 mg tablet, TAKE 1 TABLET BY MOUTH DAILY FOR 10 DAYS. (Patient not taking: Reported on 2/19/2024), Disp: , Rfl:     mupirocin (BACTROBAN) 2 % ointment, Apply topically 2 (two) times a day for 7 days, Disp: 22 g, Rfl: 0    NON FORMULARY, , Disp: , Rfl:     Omega-3 Fatty Acids (FISH OIL) 1,000 mg, Take 1,000 mg by mouth daily (Patient not taking: Reported on 8/26/2024), Disp: , Rfl:     rosuvastatin (CRESTOR) 40 MG tablet, Take 40 mg by mouth daily (Patient not taking: Reported on 8/8/2022), Disp: , Rfl:     tobramycin (TOBREX) 0.3 % OINT, Administer 0.5 inches to the right eye 3 (three) times a day (Patient not taking: Reported on 8/11/2019), Disp: 1 Tube, Rfl: 0    triamcinolone (KENALOG) 0.5 % cream, Apply topically 2 (two) times a day (Patient not taking: Reported on 8/26/2024), Disp: 15 g, Rfl: 1    Current Allergies     Allergies as of 08/26/2024 - Reviewed 08/26/2024   Allergen Reaction Noted    Penicillins Other (See Comments) 02/24/2016    Vancomycin Rash 02/24/2016            The following portions of the patient's history were reviewed and updated as appropriate: allergies, current medications, past family history, past medical history, past social history, past surgical history and problem list.     Past Medical History:   Diagnosis Date    Cellulitis     right calf, above left foot    Colitis     CPAP (continuous positive airway pressure) dependence     Hypercholesteremia     Hyperlipidemia     Sleep apnea     on CPAP at 4.5       Past Surgical History:   Procedure Laterality Date    BACK SURGERY  12/18/2023    COLONOSCOPY      HAND SURGERY Bilateral     L index finger, R middle and index  finger trigger release    DE COLONOSCOPY FLX DX W/COLLJ SPEC WHEN PFRMD N/A 02/25/2016    Procedure: COLONOSCOPY;  Surgeon: Shilo Drake MD;  [facility];  Service: Gastroenterology    TRIGGER FINGER RELEASE      WISDOM TOOTH EXTRACTION         Family History   Problem Relation Age of Onset    Coronary artery disease Mother     Other Mother         Malignant neoplasm of esophagus    Coronary artery disease Father     Other Father         Malignant neoplasm of esophagus         Medications have been verified.        Objective   /82   Pulse 65   Temp 97.8 °F (36.6 °C)   Resp 18   SpO2 97%        Physical Exam     Physical Exam  Vitals and nursing note reviewed.   Constitutional:       Appearance: Normal appearance. He is normal weight.   HENT:      Head: Normocephalic and atraumatic.   Cardiovascular:      Rate and Rhythm: Normal rate and regular rhythm.      Pulses: Normal pulses.      Heart sounds: Normal heart sounds.   Pulmonary:      Effort: Pulmonary effort is normal.      Breath sounds: Normal breath sounds.   Abdominal:      General: Abdomen is flat. Bowel sounds are normal.      Palpations: Abdomen is soft.   Musculoskeletal:         General: Normal range of motion.      Cervical back: Normal range of motion and neck supple.   Skin:     General: Skin is warm.      Capillary Refill: Capillary refill takes less than 2 seconds.      Comments: + neck upper chest and bilat arm poison ivy    Neurological:      Mental Status: He is alert and oriented to person, place, and time.   Psychiatric:         Behavior: Behavior normal.

## 2024-08-28 ENCOUNTER — APPOINTMENT (OUTPATIENT)
Dept: LAB | Facility: MEDICAL CENTER | Age: 54
End: 2024-08-28
Payer: COMMERCIAL

## 2024-08-28 DIAGNOSIS — Z79.899 ENCOUNTER FOR LONG-TERM (CURRENT) USE OF OTHER MEDICATIONS: ICD-10-CM

## 2024-08-28 LAB
ALBUMIN SERPL BCG-MCNC: 4.6 G/DL (ref 3.5–5)
ALP SERPL-CCNC: 61 U/L (ref 34–104)
ALT SERPL W P-5'-P-CCNC: 24 U/L (ref 7–52)
AST SERPL W P-5'-P-CCNC: 25 U/L (ref 13–39)
BILIRUB DIRECT SERPL-MCNC: 0.14 MG/DL (ref 0–0.2)
BILIRUB SERPL-MCNC: 0.49 MG/DL (ref 0.2–1)
PROT SERPL-MCNC: 7.5 G/DL (ref 6.4–8.4)

## 2024-08-28 PROCEDURE — 80076 HEPATIC FUNCTION PANEL: CPT

## 2024-08-28 PROCEDURE — 36415 COLL VENOUS BLD VENIPUNCTURE: CPT

## 2024-09-12 NOTE — PATIENT INSTRUCTIONS
Continue Albuterol 2 puffs every 4-6 hours as needed   Start Advair 250/50- 1 inhalation twice daily  Follow-up with PCP if symptoms persist.   
Cardiac

## 2025-01-02 ENCOUNTER — OFFICE VISIT (OUTPATIENT)
Dept: URGENT CARE | Facility: MEDICAL CENTER | Age: 55
End: 2025-01-02
Payer: COMMERCIAL

## 2025-01-02 VITALS
DIASTOLIC BLOOD PRESSURE: 74 MMHG | HEIGHT: 71 IN | BODY MASS INDEX: 31.08 KG/M2 | SYSTOLIC BLOOD PRESSURE: 124 MMHG | WEIGHT: 222 LBS | TEMPERATURE: 97.5 F | RESPIRATION RATE: 18 BRPM | OXYGEN SATURATION: 97 % | HEART RATE: 89 BPM

## 2025-01-02 DIAGNOSIS — H00.021 HORDEOLUM INTERNUM OF RIGHT UPPER EYELID: Primary | ICD-10-CM

## 2025-01-02 PROCEDURE — S9083 URGENT CARE CENTER GLOBAL: HCPCS

## 2025-01-02 PROCEDURE — G0382 LEV 3 HOSP TYPE B ED VISIT: HCPCS

## 2025-01-02 RX ORDER — ERYTHROMYCIN 5 MG/G
0.5 OINTMENT OPHTHALMIC EVERY 8 HOURS SCHEDULED
Qty: 15 G | Refills: 0 | Status: SHIPPED | OUTPATIENT
Start: 2025-01-02 | End: 2025-01-07

## 2025-01-02 NOTE — PROGRESS NOTES
[facility]        NAME: Torres Bach is a 54 y.o. male  : 1970    MRN: [id number]  DATE: 2025  TIME: 11:49 AM    Assessment and Plan   Hordeolum internum of right upper eyelid [H00.021]  1. Hordeolum internum of right upper eyelid  erythromycin (ILOTYCIN) ophthalmic ointment            Patient Instructions     Recommend frequent warm compresses to affected area.  May apply erythromycin ointment to affected area 3 times daily for 5 days.  Follow up with PCP in 3-5 days.  Proceed to  ER if symptoms worsen.    If tests are performed, our office will contact you with results only if changes need to made to the care plan discussed with you at the visit. You can review your full results on [facility].    Chief Complaint     Chief Complaint   Patient presents with    Stye     Started couple days with stye on right eye lid, hasn't gotten better.           History of Present Illness       54-year-old male presents for evaluation of right eye stye.  Patient states over the past 3 days he has been experiencing mild swelling and redness to his right upper eyelid.  He denies any current treatment for his symptoms.  He denies any visual disturbances or drainage.  He notes that he wears contacts and is unsure if this is related.        Review of Systems   Review of Systems   Eyes:  Positive for redness. Negative for photophobia, pain, discharge, itching and visual disturbance.   All other systems reviewed and are negative.        Current Medications       Current Outpatient Medications:     erythromycin (ILOTYCIN) ophthalmic ointment, Administer 0.5 inches to the right eye every 8 (eight) hours for 5 days, Disp: 15 g, Rfl: 0    hydrocortisone 2.5 % cream, Apply topically 2 (two) times a day, Disp: 28 g, Rfl: 0    LORazepam (ATIVAN) 0.5 mg tablet, Take 0.5 mg by mouth every 6 (six) hours as needed, Disp: , Rfl:     magnesium 30 MG tablet, Take 30 mg by mouth 2 (two) times a day, Disp: ,  Rfl:     rosuvastatin (CRESTOR) 10 MG tablet, , Disp: , Rfl:     albuterol (ProAir HFA) 90 mcg/act inhaler, Inhale 2 puffs every 6 (six) hours as needed for wheezing (Patient not taking: Reported on 1/2/2025), Disp: 8.5 g, Rfl: 0    albuterol (PROVENTIL HFA,VENTOLIN HFA) 90 mcg/act inhaler, INHALE 2 PUFFS EVERY 6 HOURS AS NEEDED FOR WHEEZING (Patient not taking: Reported on 1/2/2025), Disp: 6.7 g, Rfl: 0    azithromycin (ZITHROMAX) 250 mg tablet, TAKE 2 TABLETS BY MOUTH TODAY, THEN TAKE 1 TABLET DAILY FOR 4 DAYS AS DIRECTED (Patient not taking: Reported on 1/2/2025), Disp: , Rfl:     b complex vitamins capsule, Take 1 capsule by mouth daily (Patient not taking: Reported on 1/2/2025), Disp: , Rfl:     Barberry-Oreg Grape-Goldenseal (BERBERINE COMPLEX PO), Take by mouth (Patient not taking: Reported on 1/2/2025), Disp: , Rfl:     benzonatate (TESSALON PERLES) 100 mg capsule, Take 1 capsule (100 mg total) by mouth 3 (three) times a day as needed for cough (Patient not taking: Reported on 1/2/2025), Disp: 20 capsule, Rfl: 0    cholecalciferol (VITAMIN D3) 1,000 units tablet, Take 1,000 Units by mouth daily (Patient not taking: Reported on 1/2/2025), Disp: , Rfl:     Cinnamon 500 MG capsule, Take 500 mg by mouth daily (Patient not taking: Reported on 1/2/2025), Disp: , Rfl:     co-enzyme Q-10 30 MG capsule, Take 30 mg by mouth 3 (three) times a day (Patient not taking: Reported on 1/2/2025), Disp: , Rfl:     cyclobenzaprine (FLEXERIL) 10 mg tablet, Take 10 mg by mouth 3 (three) times a day as needed for muscle spasms (Patient not taking: Reported on 1/2/2025), Disp: , Rfl:     doxycycline hyclate (VIBRAMYCIN) 100 mg capsule, Take by mouth (Patient not taking: Reported on 1/2/2025), Disp: , Rfl:     fenofibrate (TRICOR) 145 mg tablet, TAKE 1 TABLET DAILY. (Patient not taking: Reported on 1/2/2025), Disp: 90 tablet, Rfl: 3    Fluticasone-Salmeterol (Advair Diskus) 250-50 mcg/dose inhaler, Inhale 1 puff 2 (two) times a day  Rinse mouth after use. (Patient not taking: Reported on 6/14/2024), Disp: 60 blister, Rfl: 0    gabapentin (NEURONTIN) 300 mg capsule, Take 300 mg by mouth 3 (three) times a day (Patient not taking: Reported on 1/2/2025), Disp: , Rfl:     gabapentin (NEURONTIN) 600 MG tablet, Take 600 mg by mouth 3 (three) times a day (Patient not taking: Reported on 1/2/2025), Disp: , Rfl:     gabapentin (NEURONTIN) 800 mg tablet, Take 800 mg by mouth Three times a day (Patient not taking: Reported on 6/14/2024), Disp: , Rfl:     meloxicam (MOBIC) 15 mg tablet, TAKE 1 TABLET BY MOUTH DAILY FOR 10 DAYS. (Patient not taking: Reported on 1/2/2025), Disp: , Rfl:     mupirocin (BACTROBAN) 2 % ointment, Apply topically 2 (two) times a day for 7 days, Disp: 22 g, Rfl: 0    NON FORMULARY, , Disp: , Rfl:     Omega-3 Fatty Acids (FISH OIL) 1,000 mg, Take 1,000 mg by mouth daily (Patient not taking: Reported on 1/2/2025), Disp: , Rfl:     predniSONE 10 mg tablet, 5 tabs po qd x 2 days then 4 tabs po qd x 2 days then 3 tabs po qd x 2 days then 2 tabs po qd x 2 days then 1 tab po qd x 2 days (Patient not taking: Reported on 1/2/2025), Disp: 30 tablet, Rfl: 0    rosuvastatin (CRESTOR) 40 MG tablet, Take 40 mg by mouth daily (Patient not taking: Reported on 8/8/2022), Disp: , Rfl:     sildenafil (VIAGRA) 25 MG tablet, , Disp: , Rfl:     tobramycin (TOBREX) 0.3 % OINT, Administer 0.5 inches to the right eye 3 (three) times a day (Patient not taking: Reported on 1/2/2025), Disp: 1 Tube, Rfl: 0    triamcinolone (KENALOG) 0.5 % cream, Apply topically 2 (two) times a day (Patient not taking: Reported on 1/2/2025), Disp: 15 g, Rfl: 1    Current Allergies     Allergies as of 01/02/2025 - Reviewed 01/02/2025   Allergen Reaction Noted    Penicillins Other (See Comments) 02/24/2016    Vancomycin Rash 02/24/2016            The following portions of the patient's history were reviewed and updated as appropriate: allergies, current medications, past family  "history, past medical history, past social history, past surgical history and problem list.     Past Medical History:   Diagnosis Date    Cellulitis     right calf, above left foot    Colitis     CPAP (continuous positive airway pressure) dependence     Hypercholesteremia     Hyperlipidemia     Sleep apnea     on CPAP at 4.5       Past Surgical History:   Procedure Laterality Date    BACK SURGERY  12/18/2023    COLONOSCOPY      HAND SURGERY Bilateral     L index finger, R middle and index finger trigger release    ME COLONOSCOPY FLX DX W/COLLJ SPEC WHEN PFRMD N/A 02/25/2016    Procedure: COLONOSCOPY;  Surgeon: Shilo Drake MD;  [facility];  Service: Gastroenterology    TRIGGER FINGER RELEASE      WISDOM TOOTH EXTRACTION         Family History   Problem Relation Age of Onset    Coronary artery disease Mother     Other Mother         Malignant neoplasm of esophagus    Coronary artery disease Father     Other Father         Malignant neoplasm of esophagus         Medications have been verified.        Objective   /74   Pulse 89   Temp 97.5 °F (36.4 °C)   Resp 18   Ht 5' 11\" (1.803 m)   Wt 101 kg (222 lb)   SpO2 97%   BMI 30.96 kg/m²        Physical Exam     Physical Exam  Vitals and nursing note reviewed.   Constitutional:       General: He is not in acute distress.     Appearance: Normal appearance. He is normal weight. He is not ill-appearing, toxic-appearing or diaphoretic.   HENT:      Head: Normocephalic and atraumatic.   Eyes:      General: Lids are normal. Lids are everted, no foreign bodies appreciated.         Right eye: Hordeolum present. No discharge.         Left eye: No discharge or hordeolum.   Cardiovascular:      Rate and Rhythm: Normal rate.      Pulses: Normal pulses.   Pulmonary:      Effort: Pulmonary effort is normal.   Skin:     General: Skin is warm and dry.   Neurological:      Mental Status: He is alert.   Psychiatric:         Mood and Affect: Mood normal.         " Behavior: Behavior normal.

## 2025-01-02 NOTE — PATIENT INSTRUCTIONS
Recommend frequent warm compresses to affected area.  May apply erythromycin ointment to affected area 3 times daily for 5 days.  Follow up with PCP in 3-5 days.  Proceed to  ER if symptoms worsen.    If tests are performed, our office will contact you with results only if changes need to made to the care plan discussed with you at the visit. You can review your full results on [facility].

## 2025-05-30 ENCOUNTER — OFFICE VISIT (OUTPATIENT)
Dept: URGENT CARE | Facility: MEDICAL CENTER | Age: 55
End: 2025-05-30
Payer: COMMERCIAL

## 2025-05-30 VITALS
RESPIRATION RATE: 18 BRPM | DIASTOLIC BLOOD PRESSURE: 77 MMHG | OXYGEN SATURATION: 98 % | TEMPERATURE: 97.9 F | BODY MASS INDEX: 31.24 KG/M2 | WEIGHT: 224 LBS | HEART RATE: 78 BPM | SYSTOLIC BLOOD PRESSURE: 119 MMHG

## 2025-05-30 DIAGNOSIS — H00.14 CHALAZION LEFT UPPER EYELID: Primary | ICD-10-CM

## 2025-05-30 PROCEDURE — S9083 URGENT CARE CENTER GLOBAL: HCPCS | Performed by: PHYSICIAN ASSISTANT

## 2025-05-30 PROCEDURE — G0382 LEV 3 HOSP TYPE B ED VISIT: HCPCS | Performed by: PHYSICIAN ASSISTANT

## 2025-05-30 RX ORDER — ERYTHROMYCIN 5 MG/G
0.5 OINTMENT OPHTHALMIC EVERY 12 HOURS SCHEDULED
Qty: 14 G | Refills: 0 | Status: SHIPPED | OUTPATIENT
Start: 2025-05-30 | End: 2025-06-06

## 2025-05-30 RX ORDER — CEPHALEXIN 500 MG/1
500 CAPSULE ORAL EVERY 6 HOURS SCHEDULED
Qty: 28 CAPSULE | Refills: 0 | Status: SHIPPED | OUTPATIENT
Start: 2025-05-30 | End: 2025-06-06

## 2025-05-30 NOTE — PROGRESS NOTES
[facility]        NAME: Torres Bach is a 55 y.o. male  : 1970    MRN: [id number]  DATE: May 30, 2025  TIME: 7:03 PM    Assessment and Plan   Chalazion left upper eyelid [H00.14]  1. Chalazion left upper eyelid  erythromycin (ILOTYCIN) ophthalmic ointment    cephalexin (KEFLEX) 500 mg capsule            Patient Instructions     Apply ointment twice a day for 1 week  Take antibiotics as directed  Follow up with eye doctor Monday as scheduled   Follow up with PCP in 3-5 days.  Proceed to  ER if symptoms worsen.    If tests have been performed at [facility], our office will contact you with results if changes need to be made to the care plan discussed with you at the visit.  You can [facility] on [facility].    Chief Complaint     Chief Complaint   Patient presents with    Eye Problem     Pt. C/O redness and swelling to his left eye for the past 2 days.          History of Present Illness       HPI  56 y/o male presents for evaluation of left eye redness and swelling which started two days ago.  He has been applying warm compresses with some relief.  He denies photophobia/eye pain/blurring vision.    PCN allergy - unknown reaction, told by mother he was allergic  Review of Systems   Review of Systems   Constitutional:  Negative for chills and fever.   HENT:  Negative for ear pain and sore throat.    Eyes:  Positive for pain, discharge, redness and itching. Negative for photophobia and visual disturbance.   Respiratory:  Negative for cough and shortness of breath.    Cardiovascular:  Negative for chest pain and palpitations.   Gastrointestinal:  Negative for abdominal pain and vomiting.   Genitourinary:  Negative for dysuria and hematuria.   Musculoskeletal:  Negative for arthralgias and back pain.   Skin:  Negative for color change and rash.   Neurological:  Negative for seizures and syncope.   All other systems reviewed and are negative.        Current Medications      Current Medications[1]    Current Allergies     Allergies as of 05/30/2025 - Reviewed 05/30/2025   Allergen Reaction Noted    Penicillins Other (See Comments) 02/24/2016    Vancomycin Rash 02/24/2016            The following portions of the patient's history were reviewed and updated as appropriate: allergies, current medications, past family history, past medical history, past social history, past surgical history and problem list.     Past Medical History[2]    Past Surgical History[3]    Family History[4]      Medications have been verified.        Objective   /77   Pulse 78   Temp 97.9 °F (36.6 °C)   Resp 18   Wt 102 kg (224 lb)   SpO2 98%   BMI 31.24 kg/m²   No LMP for male patient.       Physical Exam     Physical Exam  Vitals and nursing note reviewed.   Constitutional:       Appearance: Normal appearance. He is not ill-appearing.   HENT:      Head: Normocephalic and atraumatic.      Nose: Nose normal.      Mouth/Throat:      Mouth: Mucous membranes are moist.      Pharynx: No posterior oropharyngeal erythema.     Eyes:      Extraocular Movements: Extraocular movements intact.      Conjunctiva/sclera: Conjunctivae normal.      Pupils: Pupils are equal, round, and reactive to light.      Comments: Left upper eyelid edematous and erythematous with 2 chalazion visualized      Cardiovascular:      Rate and Rhythm: Normal rate and regular rhythm.      Pulses: Normal pulses.      Heart sounds: Normal heart sounds.   Pulmonary:      Effort: Pulmonary effort is normal.      Breath sounds: Normal breath sounds.     Skin:     General: Skin is warm and dry.     Neurological:      Mental Status: He is alert and oriented to person, place, and time.     Psychiatric:         Mood and Affect: Mood normal.         Behavior: Behavior normal.                        [1]   Current Outpatient Medications:     cephalexin (KEFLEX) 500 mg capsule, Take 1 capsule (500 mg total) by mouth every 6 (six) hours for 7 days,  Disp: 28 capsule, Rfl: 0    erythromycin (ILOTYCIN) ophthalmic ointment, Administer 0.5 inches into the left eye every 12 (twelve) hours for 7 days, Disp: 14 g, Rfl: 0    LORazepam (ATIVAN) 0.5 mg tablet, Take 0.5 mg by mouth every 6 (six) hours as needed, Disp: , Rfl:     rosuvastatin (CRESTOR) 10 MG tablet, , Disp: , Rfl:     albuterol (ProAir HFA) 90 mcg/act inhaler, Inhale 2 puffs every 6 (six) hours as needed for wheezing (Patient not taking: Reported on 6/14/2024), Disp: 8.5 g, Rfl: 0    albuterol (PROVENTIL HFA,VENTOLIN HFA) 90 mcg/act inhaler, INHALE 2 PUFFS EVERY 6 HOURS AS NEEDED FOR WHEEZING (Patient not taking: Reported on 5/30/2025), Disp: 6.7 g, Rfl: 0    azithromycin (ZITHROMAX) 250 mg tablet, TAKE 2 TABLETS BY MOUTH TODAY, THEN TAKE 1 TABLET DAILY FOR 4 DAYS AS DIRECTED (Patient not taking: Reported on 2/19/2024), Disp: , Rfl:     b complex vitamins capsule, Take 1 capsule by mouth daily (Patient not taking: Reported on 8/26/2024), Disp: , Rfl:     Barberry-Oreg Grape-Goldenseal (BERBERINE COMPLEX PO), Take by mouth (Patient not taking: Reported on 8/26/2024), Disp: , Rfl:     benzonatate (TESSALON PERLES) 100 mg capsule, Take 1 capsule (100 mg total) by mouth 3 (three) times a day as needed for cough (Patient not taking: Reported on 6/14/2024), Disp: 20 capsule, Rfl: 0    cholecalciferol (VITAMIN D3) 1,000 units tablet, Take 1,000 Units by mouth daily (Patient not taking: Reported on 8/26/2024), Disp: , Rfl:     Cinnamon 500 MG capsule, Take 500 mg by mouth daily (Patient not taking: Reported on 8/26/2024), Disp: , Rfl:     co-enzyme Q-10 30 MG capsule, Take 30 mg by mouth 3 (three) times a day (Patient not taking: Reported on 8/26/2024), Disp: , Rfl:     cyclobenzaprine (FLEXERIL) 10 mg tablet, Take 10 mg by mouth 3 (three) times a day as needed for muscle spasms (Patient not taking: Reported on 2/19/2024), Disp: , Rfl:     doxycycline hyclate (VIBRAMYCIN) 100 mg capsule, Take by mouth (Patient not  taking: Reported on 8/8/2022), Disp: , Rfl:     fenofibrate (TRICOR) 145 mg tablet, TAKE 1 TABLET DAILY. (Patient not taking: Reported on 8/26/2024), Disp: 90 tablet, Rfl: 3    Fluticasone-Salmeterol (Advair Diskus) 250-50 mcg/dose inhaler, Inhale 1 puff 2 (two) times a day Rinse mouth after use. (Patient not taking: Reported on 6/14/2024), Disp: 60 blister, Rfl: 0    gabapentin (NEURONTIN) 300 mg capsule, Take 300 mg by mouth 3 (three) times a day (Patient not taking: Reported on 2/19/2024), Disp: , Rfl:     gabapentin (NEURONTIN) 600 MG tablet, Take 600 mg by mouth 3 (three) times a day (Patient not taking: Reported on 2/19/2024), Disp: , Rfl:     gabapentin (NEURONTIN) 800 mg tablet, Take 800 mg by mouth Three times a day (Patient not taking: Reported on 6/14/2024), Disp: , Rfl:     hydrocortisone 2.5 % cream, Apply topically 2 (two) times a day (Patient not taking: Reported on 5/30/2025), Disp: 28 g, Rfl: 0    magnesium 30 MG tablet, Take 30 mg by mouth 2 (two) times a day (Patient not taking: Reported on 5/30/2025), Disp: , Rfl:     meloxicam (MOBIC) 15 mg tablet, TAKE 1 TABLET BY MOUTH DAILY FOR 10 DAYS. (Patient not taking: Reported on 2/19/2024), Disp: , Rfl:     mupirocin (BACTROBAN) 2 % ointment, Apply topically 2 (two) times a day for 7 days, Disp: 22 g, Rfl: 0    NON FORMULARY, , Disp: , Rfl:     Omega-3 Fatty Acids (FISH OIL) 1,000 mg, Take 1,000 mg by mouth daily (Patient not taking: Reported on 8/26/2024), Disp: , Rfl:     predniSONE 10 mg tablet, 5 tabs po qd x 2 days then 4 tabs po qd x 2 days then 3 tabs po qd x 2 days then 2 tabs po qd x 2 days then 1 tab po qd x 2 days (Patient not taking: Reported on 5/30/2025), Disp: 30 tablet, Rfl: 0    rosuvastatin (CRESTOR) 40 MG tablet, Take 40 mg by mouth daily (Patient not taking: Reported on 8/8/2022), Disp: , Rfl:     sildenafil (VIAGRA) 25 MG tablet, , Disp: , Rfl:     triamcinolone (KENALOG) 0.5 % cream, Apply topically 2 (two) times a day (Patient  not taking: Reported on 8/26/2024), Disp: 15 g, Rfl: 1  [2]   Past Medical History:  Diagnosis Date    Cellulitis     right calf, above left foot    Colitis     CPAP (continuous positive airway pressure) dependence     Hypercholesteremia     Hyperlipidemia     Sleep apnea     on CPAP at 4.5   [3]   Past Surgical History:  Procedure Laterality Date    BACK SURGERY  12/18/2023    COLONOSCOPY      HAND SURGERY Bilateral     L index finger, R middle and index finger trigger release    SD COLONOSCOPY FLX DX W/COLLJ SPEC WHEN PFRMD N/A 02/25/2016    Procedure: COLONOSCOPY;  Surgeon: Shilo Drake MD;  Location[facility];  Service: Gastroenterology    TRIGGER FINGER RELEASE      WISDOM TOOTH EXTRACTION     [4]   Family History  Problem Relation Name Age of Onset    Coronary artery disease Mother      Other Mother          Malignant neoplasm of esophagus    Coronary artery disease Father      Other Father          Malignant neoplasm of esophagus      No